# Patient Record
Sex: FEMALE | Race: WHITE | NOT HISPANIC OR LATINO | Employment: FULL TIME | URBAN - METROPOLITAN AREA
[De-identification: names, ages, dates, MRNs, and addresses within clinical notes are randomized per-mention and may not be internally consistent; named-entity substitution may affect disease eponyms.]

---

## 2023-03-12 DIAGNOSIS — Z78.9 ALLERGY HISTORY UNKNOWN: ICD-10-CM

## 2023-03-13 RX ORDER — LANCETS 30 GAUGE
EACH MISCELLANEOUS
COMMUNITY
Start: 2022-09-12

## 2023-03-13 RX ORDER — HYDROXYZINE PAMOATE 50 MG/1
CAPSULE ORAL
COMMUNITY
Start: 2022-09-11 | End: 2023-04-28

## 2023-03-13 RX ORDER — OXYCODONE AND ACETAMINOPHEN 5; 325 MG/1; MG/1
1 TABLET ORAL EVERY 6 HOURS PRN
COMMUNITY
Start: 2022-07-25 | End: 2023-04-28 | Stop reason: ALTCHOICE

## 2023-03-13 RX ORDER — FLUTICASONE PROPIONATE 50 MCG
SPRAY, SUSPENSION (ML) NASAL
Qty: 16 G | Refills: 10 | Status: SHIPPED | OUTPATIENT
Start: 2023-03-13 | End: 2023-04-28 | Stop reason: SDUPTHER

## 2023-03-13 RX ORDER — METFORMIN HYDROCHLORIDE 750 MG/1
1 TABLET, EXTENDED RELEASE ORAL DAILY
COMMUNITY
Start: 2022-02-25 | End: 2023-04-28 | Stop reason: SINTOL

## 2023-03-13 RX ORDER — PANTOPRAZOLE SODIUM 40 MG/1
1 TABLET, DELAYED RELEASE ORAL 2 TIMES DAILY
COMMUNITY
Start: 2022-05-24 | End: 2023-04-05

## 2023-03-13 RX ORDER — TRAZODONE HYDROCHLORIDE 50 MG/1
TABLET ORAL
COMMUNITY
End: 2023-04-28

## 2023-03-13 RX ORDER — ALBUTEROL SULFATE 90 UG/1
AEROSOL, METERED RESPIRATORY (INHALATION)
COMMUNITY
Start: 2022-01-25 | End: 2023-04-28 | Stop reason: SDUPTHER

## 2023-03-13 RX ORDER — NEOMYCIN SULFATE, POLYMYXIN B SULFATE AND DEXAMETHASONE 3.5; 10000; 1 MG/ML; [USP'U]/ML; MG/ML
1 SUSPENSION/ DROPS OPHTHALMIC 2 TIMES DAILY
COMMUNITY
Start: 2022-03-16 | End: 2023-04-28

## 2023-03-13 RX ORDER — HYDROCHLOROTHIAZIDE 12.5 MG/1
1 CAPSULE ORAL DAILY
COMMUNITY
Start: 2022-01-25 | End: 2023-04-28 | Stop reason: SDUPTHER

## 2023-03-13 RX ORDER — PREDNISONE 20 MG/1
2 TABLET ORAL DAILY
COMMUNITY
Start: 2023-01-28 | End: 2023-04-28 | Stop reason: ALTCHOICE

## 2023-03-13 RX ORDER — IPRATROPIUM BROMIDE AND ALBUTEROL 20; 100 UG/1; UG/1
SPRAY, METERED RESPIRATORY (INHALATION)
COMMUNITY
End: 2023-04-28 | Stop reason: SDUPTHER

## 2023-03-13 RX ORDER — EMPAGLIFLOZIN 25 MG/1
1 TABLET, FILM COATED ORAL DAILY
COMMUNITY
Start: 2022-05-24 | End: 2023-04-28 | Stop reason: SDUPTHER

## 2023-03-13 RX ORDER — NAPROXEN 500 MG/1
TABLET ORAL
COMMUNITY
Start: 2023-03-06

## 2023-03-13 RX ORDER — TRAZODONE HYDROCHLORIDE 100 MG/1
1 TABLET ORAL NIGHTLY
COMMUNITY
Start: 2021-11-15 | End: 2023-04-28 | Stop reason: ALTCHOICE

## 2023-03-13 RX ORDER — OMEPRAZOLE 20 MG/1
20 CAPSULE, DELAYED RELEASE ORAL
COMMUNITY
Start: 2022-03-27 | End: 2023-12-15 | Stop reason: WASHOUT

## 2023-03-13 RX ORDER — ALCOHOL ANTISEPTIC PADS
PADS, MEDICATED (EA) TOPICAL
COMMUNITY
Start: 2022-09-12

## 2023-03-13 RX ORDER — AMOXICILLIN AND CLAVULANATE POTASSIUM 875; 125 MG/1; MG/1
1 TABLET, FILM COATED ORAL 2 TIMES DAILY
COMMUNITY
Start: 2023-01-28 | End: 2023-04-28 | Stop reason: ALTCHOICE

## 2023-03-13 RX ORDER — GABAPENTIN 100 MG/1
1 CAPSULE ORAL 2 TIMES DAILY
COMMUNITY
Start: 2022-02-25 | End: 2023-04-28

## 2023-03-13 RX ORDER — LANCETS 30 GAUGE
EACH MISCELLANEOUS
COMMUNITY
Start: 2022-07-12

## 2023-03-13 RX ORDER — DICLOFENAC SODIUM 10 MG/G
GEL TOPICAL DAILY
COMMUNITY
Start: 2022-11-11 | End: 2023-04-28 | Stop reason: SDUPTHER

## 2023-03-13 RX ORDER — EMPAGLIFLOZIN 10 MG/1
10 TABLET, FILM COATED ORAL DAILY
COMMUNITY
End: 2023-04-28

## 2023-03-13 RX ORDER — GLIPIZIDE 5 MG/1
1 TABLET ORAL 3 TIMES DAILY
COMMUNITY
Start: 2022-01-25 | End: 2023-04-28 | Stop reason: SDUPTHER

## 2023-03-13 RX ORDER — ESCITALOPRAM OXALATE 20 MG/1
1 TABLET ORAL NIGHTLY
COMMUNITY
Start: 2022-01-25 | End: 2023-04-28 | Stop reason: ALTCHOICE

## 2023-03-13 RX ORDER — ATORVASTATIN CALCIUM 40 MG/1
1 TABLET, FILM COATED ORAL DAILY
COMMUNITY
Start: 2022-01-25 | End: 2023-04-28 | Stop reason: SDUPTHER

## 2023-03-13 RX ORDER — FLUTICASONE PROPIONATE 50 MCG
SPRAY, SUSPENSION (ML) NASAL
COMMUNITY
Start: 2022-01-25 | End: 2023-04-28

## 2023-03-13 RX ORDER — MONTELUKAST SODIUM 10 MG/1
TABLET ORAL
Qty: 30 TABLET | Refills: 10 | Status: SHIPPED | OUTPATIENT
Start: 2023-03-13 | End: 2023-04-28 | Stop reason: SDUPTHER

## 2023-03-13 RX ORDER — HYDROXYZINE PAMOATE 25 MG/1
CAPSULE ORAL
COMMUNITY
Start: 2023-03-03 | End: 2023-04-28

## 2023-03-13 RX ORDER — BLOOD-GLUCOSE METER
EACH MISCELLANEOUS
COMMUNITY
Start: 2022-05-24

## 2023-03-13 RX ORDER — NICOTINE 7MG/24HR
PATCH, TRANSDERMAL 24 HOURS TRANSDERMAL
COMMUNITY
End: 2023-12-15 | Stop reason: WASHOUT

## 2023-03-13 RX ORDER — MONTELUKAST SODIUM 10 MG/1
1 TABLET ORAL NIGHTLY
COMMUNITY
Start: 2022-01-25 | End: 2023-04-28

## 2023-04-04 DIAGNOSIS — K21.9 GASTROESOPHAGEAL REFLUX DISEASE WITHOUT ESOPHAGITIS: Primary | ICD-10-CM

## 2023-04-05 RX ORDER — PANTOPRAZOLE SODIUM 40 MG/1
TABLET, DELAYED RELEASE ORAL
Qty: 90 TABLET | Refills: 2 | Status: SHIPPED | OUTPATIENT
Start: 2023-04-05

## 2023-04-28 ENCOUNTER — OFFICE VISIT (OUTPATIENT)
Dept: PRIMARY CARE | Facility: CLINIC | Age: 53
End: 2023-04-28
Payer: COMMERCIAL

## 2023-04-28 VITALS
BODY MASS INDEX: 37.17 KG/M2 | WEIGHT: 202 LBS | SYSTOLIC BLOOD PRESSURE: 138 MMHG | HEIGHT: 62 IN | DIASTOLIC BLOOD PRESSURE: 82 MMHG

## 2023-04-28 DIAGNOSIS — E11.9 TYPE 2 DIABETES MELLITUS WITHOUT COMPLICATION, WITHOUT LONG-TERM CURRENT USE OF INSULIN (MULTI): Primary | ICD-10-CM

## 2023-04-28 DIAGNOSIS — Z78.9 ALLERGY HISTORY UNKNOWN: ICD-10-CM

## 2023-04-28 DIAGNOSIS — F41.9 ANXIETY: ICD-10-CM

## 2023-04-28 DIAGNOSIS — M19.90 ARTHRITIS: ICD-10-CM

## 2023-04-28 DIAGNOSIS — J43.9 PULMONARY EMPHYSEMA, UNSPECIFIED EMPHYSEMA TYPE (MULTI): ICD-10-CM

## 2023-04-28 DIAGNOSIS — Z00.00 HEALTHCARE MAINTENANCE: ICD-10-CM

## 2023-04-28 DIAGNOSIS — Z91.199 NONCOMPLIANCE WITH DIABETES TREATMENT: ICD-10-CM

## 2023-04-28 DIAGNOSIS — I10 PRIMARY HYPERTENSION: ICD-10-CM

## 2023-04-28 PROBLEM — J43.8 OTHER EMPHYSEMA (MULTI): Status: ACTIVE | Noted: 2023-04-28

## 2023-04-28 PROBLEM — E13.9 DIABETES 1.5, MANAGED AS TYPE 2 (MULTI): Status: ACTIVE | Noted: 2023-04-28

## 2023-04-28 PROBLEM — E78.41 ELEVATED LIPOPROTEIN(A): Status: ACTIVE | Noted: 2023-04-28

## 2023-04-28 PROBLEM — E66.9 OBESITY: Status: ACTIVE | Noted: 2023-04-28

## 2023-04-28 PROCEDURE — 99214 OFFICE O/P EST MOD 30 MIN: CPT | Performed by: FAMILY MEDICINE

## 2023-04-28 PROCEDURE — 3008F BODY MASS INDEX DOCD: CPT | Performed by: FAMILY MEDICINE

## 2023-04-28 PROCEDURE — 1036F TOBACCO NON-USER: CPT | Performed by: FAMILY MEDICINE

## 2023-04-28 PROCEDURE — 3079F DIAST BP 80-89 MM HG: CPT | Performed by: FAMILY MEDICINE

## 2023-04-28 PROCEDURE — 4010F ACE/ARB THERAPY RXD/TAKEN: CPT | Performed by: FAMILY MEDICINE

## 2023-04-28 PROCEDURE — 3075F SYST BP GE 130 - 139MM HG: CPT | Performed by: FAMILY MEDICINE

## 2023-04-28 RX ORDER — EMPAGLIFLOZIN 25 MG/1
25 TABLET, FILM COATED ORAL DAILY
Qty: 90 TABLET | Refills: 3 | Status: SHIPPED | OUTPATIENT
Start: 2023-04-28

## 2023-04-28 RX ORDER — GLIPIZIDE 5 MG/1
5 TABLET ORAL 3 TIMES DAILY
Qty: 90 TABLET | Refills: 3 | Status: SHIPPED | OUTPATIENT
Start: 2023-04-28

## 2023-04-28 RX ORDER — MONTELUKAST SODIUM 10 MG/1
10 TABLET ORAL NIGHTLY
Qty: 30 TABLET | Refills: 10 | Status: SHIPPED | OUTPATIENT
Start: 2023-04-28

## 2023-04-28 RX ORDER — HYDROCHLOROTHIAZIDE 12.5 MG/1
12.5 CAPSULE ORAL DAILY
Qty: 90 CAPSULE | Refills: 3 | Status: SHIPPED | OUTPATIENT
Start: 2023-04-28

## 2023-04-28 RX ORDER — DICLOFENAC SODIUM 10 MG/G
4 GEL TOPICAL DAILY
Qty: 20 G | Refills: 3 | Status: SHIPPED | OUTPATIENT
Start: 2023-04-28

## 2023-04-28 RX ORDER — LISINOPRIL 5 MG/1
5 TABLET ORAL DAILY
Qty: 30 TABLET | Refills: 5 | Status: SHIPPED | OUTPATIENT
Start: 2023-04-28 | End: 2023-10-25

## 2023-04-28 RX ORDER — HYDROXYZINE PAMOATE 25 MG/1
25 CAPSULE ORAL 3 TIMES DAILY PRN
Qty: 30 CAPSULE | Refills: 3 | Status: SHIPPED | OUTPATIENT
Start: 2023-04-28

## 2023-04-28 RX ORDER — ALBUTEROL SULFATE 90 UG/1
1 AEROSOL, METERED RESPIRATORY (INHALATION)
Qty: 18 G | Refills: 3 | Status: SHIPPED | OUTPATIENT
Start: 2023-04-28

## 2023-04-28 RX ORDER — ATORVASTATIN CALCIUM 40 MG/1
40 TABLET, FILM COATED ORAL DAILY
Qty: 90 TABLET | Refills: 3 | Status: SHIPPED | OUTPATIENT
Start: 2023-04-28

## 2023-04-28 RX ORDER — IPRATROPIUM BROMIDE AND ALBUTEROL 20; 100 UG/1; UG/1
1 SPRAY, METERED RESPIRATORY (INHALATION)
Qty: 4 G | Refills: 3 | Status: SHIPPED | OUTPATIENT
Start: 2023-04-28 | End: 2024-04-27

## 2023-04-28 RX ORDER — FLUTICASONE PROPIONATE 50 MCG
2 SPRAY, SUSPENSION (ML) NASAL DAILY
Qty: 16 G | Refills: 10 | Status: SHIPPED | OUTPATIENT
Start: 2023-04-28

## 2023-04-28 ASSESSMENT — ENCOUNTER SYMPTOMS
LIGHT-HEADEDNESS: 1
CARDIOVASCULAR NEGATIVE: 1
COUGH: 1
FREQUENCY: 1
ARTHRALGIAS: 1
HEMATOLOGIC/LYMPHATIC NEGATIVE: 1
WEAKNESS: 1
CONSTIPATION: 1
NERVOUS/ANXIOUS: 1
UNEXPECTED WEIGHT CHANGE: 1

## 2023-04-28 NOTE — PROGRESS NOTES
"Subjective   Patient ID: Vannessa Soto is a 53 y.o. female who presents for Follow-up (Patient presented to for medication management.) and Cough.    RX, ROV         Review of Systems   Constitutional:  Positive for unexpected weight change.   HENT: Negative.     Eyes:  Positive for visual disturbance.   Respiratory:  Positive for cough.    Cardiovascular: Negative.    Gastrointestinal:  Positive for constipation.   Endocrine: Positive for polyuria.   Genitourinary:  Positive for frequency.   Musculoskeletal:  Positive for arthralgias.   Skin: Negative.    Allergic/Immunologic: Positive for environmental allergies.   Neurological:  Positive for weakness and light-headedness.   Hematological: Negative.    Psychiatric/Behavioral:  The patient is nervous/anxious.        Objective   /82   Ht 1.575 m (5' 2\")   Wt 91.6 kg (202 lb)   BMI 36.95 kg/m²     Physical Exam  Constitutional:       Appearance: She is obese.   HENT:      Head: Normocephalic and atraumatic.      Nose: Nose normal.      Mouth/Throat:      Mouth: Mucous membranes are moist.   Eyes:      Pupils: Pupils are equal, round, and reactive to light.   Cardiovascular:      Rate and Rhythm: Normal rate and regular rhythm.   Pulmonary:      Effort: Pulmonary effort is normal.   Musculoskeletal:         General: Normal range of motion.      Cervical back: Normal range of motion and neck supple.   Skin:     General: Skin is warm and dry.   Neurological:      General: No focal deficit present.      Mental Status: She is alert and oriented to person, place, and time.   Psychiatric:         Mood and Affect: Mood normal.         Behavior: Behavior normal.         Assessment/Plan          "

## 2023-04-28 NOTE — PATIENT INSTRUCTIONS
Stop smoking , refuses Tx , Rx ., labs cxr mamogram, monitor BP/BS daily , follow up oph / pod , gyn , cologuard , labs tcbx 1wk

## 2023-05-01 ENCOUNTER — LAB (OUTPATIENT)
Dept: LAB | Facility: LAB | Age: 53
End: 2023-05-01
Payer: COMMERCIAL

## 2023-05-01 DIAGNOSIS — E11.9 TYPE 2 DIABETES MELLITUS WITHOUT COMPLICATION, WITHOUT LONG-TERM CURRENT USE OF INSULIN (MULTI): ICD-10-CM

## 2023-05-01 DIAGNOSIS — I10 PRIMARY HYPERTENSION: ICD-10-CM

## 2023-05-01 DIAGNOSIS — F41.9 ANXIETY: ICD-10-CM

## 2023-05-01 LAB
ALANINE AMINOTRANSFERASE (SGPT) (U/L) IN SER/PLAS: 14 U/L (ref 7–45)
ALBUMIN (G/DL) IN SER/PLAS: 4.5 G/DL (ref 3.4–5)
ALBUMIN (MG/L) IN URINE: 21.7 MG/L
ALBUMIN/CREATININE (UG/MG) IN URINE: 16.6 UG/MG CRT (ref 0–30)
ALKALINE PHOSPHATASE (U/L) IN SER/PLAS: 115 U/L (ref 33–110)
ANION GAP IN SER/PLAS: 11 MMOL/L (ref 10–20)
ASPARTATE AMINOTRANSFERASE (SGOT) (U/L) IN SER/PLAS: 10 U/L (ref 9–39)
BILIRUBIN TOTAL (MG/DL) IN SER/PLAS: 0.5 MG/DL (ref 0–1.2)
CALCIUM (MG/DL) IN SER/PLAS: 10.7 MG/DL (ref 8.6–10.3)
CARBON DIOXIDE, TOTAL (MMOL/L) IN SER/PLAS: 31 MMOL/L (ref 21–32)
CHLORIDE (MMOL/L) IN SER/PLAS: 101 MMOL/L (ref 98–107)
CHOLESTEROL (MG/DL) IN SER/PLAS: 169 MG/DL (ref 0–199)
CHOLESTEROL IN HDL (MG/DL) IN SER/PLAS: 40.3 MG/DL
CHOLESTEROL/HDL RATIO: 4.2
CREATININE (MG/DL) IN SER/PLAS: 0.88 MG/DL (ref 0.5–1.05)
CREATININE (MG/DL) IN URINE: 131 MG/DL (ref 20–320)
ERYTHROCYTE DISTRIBUTION WIDTH (RATIO) BY AUTOMATED COUNT: 12.5 % (ref 11.5–14.5)
ERYTHROCYTE MEAN CORPUSCULAR HEMOGLOBIN CONCENTRATION (G/DL) BY AUTOMATED: 33.8 G/DL (ref 32–36)
ERYTHROCYTE MEAN CORPUSCULAR VOLUME (FL) BY AUTOMATED COUNT: 90 FL (ref 80–100)
ERYTHROCYTES (10*6/UL) IN BLOOD BY AUTOMATED COUNT: 5.43 X10E12/L (ref 4–5.2)
ESTIMATED AVERAGE GLUCOSE FOR HBA1C: 197 MG/DL
GFR FEMALE: 77 ML/MIN/1.73M2
GLUCOSE (MG/DL) IN SER/PLAS: 284 MG/DL (ref 74–99)
HEMATOCRIT (%) IN BLOOD BY AUTOMATED COUNT: 48.8 % (ref 36–46)
HEMOGLOBIN (G/DL) IN BLOOD: 16.5 G/DL (ref 12–16)
HEMOGLOBIN A1C/HEMOGLOBIN TOTAL IN BLOOD: 8.5 %
LDL: 103 MG/DL (ref 0–99)
LEUKOCYTES (10*3/UL) IN BLOOD BY AUTOMATED COUNT: 12.1 X10E9/L (ref 4.4–11.3)
PLATELETS (10*3/UL) IN BLOOD AUTOMATED COUNT: 249 X10E9/L (ref 150–450)
POTASSIUM (MMOL/L) IN SER/PLAS: 4.7 MMOL/L (ref 3.5–5.3)
PROTEIN TOTAL: 6.8 G/DL (ref 6.4–8.2)
SODIUM (MMOL/L) IN SER/PLAS: 138 MMOL/L (ref 136–145)
THYROTROPIN (MIU/L) IN SER/PLAS BY DETECTION LIMIT <= 0.05 MIU/L: 1.38 MIU/L (ref 0.44–3.98)
TRIGLYCERIDE (MG/DL) IN SER/PLAS: 128 MG/DL (ref 0–149)
UREA NITROGEN (MG/DL) IN SER/PLAS: 21 MG/DL (ref 6–23)
VLDL: 26 MG/DL (ref 0–40)

## 2023-05-01 PROCEDURE — 85027 COMPLETE CBC AUTOMATED: CPT

## 2023-05-01 PROCEDURE — 80053 COMPREHEN METABOLIC PANEL: CPT

## 2023-05-01 PROCEDURE — 84443 ASSAY THYROID STIM HORMONE: CPT

## 2023-05-01 PROCEDURE — 80061 LIPID PANEL: CPT

## 2023-05-01 PROCEDURE — 82570 ASSAY OF URINE CREATININE: CPT

## 2023-05-01 PROCEDURE — 82043 UR ALBUMIN QUANTITATIVE: CPT

## 2023-05-01 PROCEDURE — 83036 HEMOGLOBIN GLYCOSYLATED A1C: CPT

## 2023-05-01 PROCEDURE — 36415 COLL VENOUS BLD VENIPUNCTURE: CPT

## 2023-05-16 ENCOUNTER — TELEPHONE (OUTPATIENT)
Dept: PRIMARY CARE | Facility: CLINIC | Age: 53
End: 2023-05-16
Payer: COMMERCIAL

## 2023-06-14 RX ORDER — NAPROXEN 500 MG/1
TABLET ORAL
Qty: 30 TABLET | Refills: 10 | OUTPATIENT
Start: 2023-06-14

## 2023-06-20 RX ORDER — NAPROXEN 500 MG/1
TABLET ORAL
Qty: 30 TABLET | Refills: 10 | OUTPATIENT
Start: 2023-06-20

## 2023-08-09 ENCOUNTER — TRANSCRIBE ORDERS (OUTPATIENT)
Dept: ADMINISTRATIVE | Age: 53
End: 2023-08-09

## 2023-08-09 DIAGNOSIS — Z72.0 TOBACCO ABUSE: Primary | ICD-10-CM

## 2023-08-09 DIAGNOSIS — G56.03 CARPAL TUNNEL SYNDROME, BILATERAL: ICD-10-CM

## 2023-08-09 DIAGNOSIS — Z12.31 ENCOUNTER FOR SCREENING MAMMOGRAM FOR MALIGNANT NEOPLASM OF BREAST: ICD-10-CM

## 2023-08-24 ENCOUNTER — HOSPITAL ENCOUNTER (OUTPATIENT)
Dept: WOMENS IMAGING | Age: 53
Discharge: HOME | End: 2023-08-26
Payer: MEDICAID

## 2023-08-24 ENCOUNTER — HOSPITAL ENCOUNTER (OUTPATIENT)
Dept: CT IMAGING | Age: 53
Discharge: HOME | End: 2023-08-26
Payer: MEDICAID

## 2023-08-24 VITALS — HEIGHT: 62 IN

## 2023-08-24 DIAGNOSIS — Z72.0 TOBACCO ABUSE: ICD-10-CM

## 2023-08-24 DIAGNOSIS — Z12.31 ENCOUNTER FOR SCREENING MAMMOGRAM FOR MALIGNANT NEOPLASM OF BREAST: ICD-10-CM

## 2023-08-24 PROCEDURE — 71271 CT THORAX LUNG CANCER SCR C-: CPT

## 2023-08-24 PROCEDURE — 77063 BREAST TOMOSYNTHESIS BI: CPT

## 2023-08-25 DIAGNOSIS — K21.9 GASTROESOPHAGEAL REFLUX DISEASE WITHOUT ESOPHAGITIS: ICD-10-CM

## 2023-08-25 RX ORDER — PANTOPRAZOLE SODIUM 40 MG/1
40 TABLET, DELAYED RELEASE ORAL 2 TIMES DAILY
Qty: 90 TABLET | Refills: 2 | Status: CANCELLED | OUTPATIENT
Start: 2023-08-25

## 2023-08-26 NOTE — TELEPHONE ENCOUNTER
Pt is not scheduled with anyone. She needs to schedule f/up to establish with someone, then see if they'll refill her meds, as it looks like she's not following up anywhere, thanks. (It does look like she saw Dr. ALEGRE 6 times last year so she's definitley overdue for f/up. Thanks.

## 2023-09-25 RX ORDER — POLYETHYLENE GLYCOL 3350, SODIUM CHLORIDE, SODIUM BICARBONATE, POTASSIUM CHLORIDE 420; 11.2; 5.72; 1.48 G/4L; G/4L; G/4L; G/4L
4000 POWDER, FOR SOLUTION ORAL ONCE
Qty: 4000 ML | Refills: 0 | Status: SHIPPED | OUTPATIENT
Start: 2023-09-25 | End: 2023-09-25

## 2023-11-01 ENCOUNTER — OFFICE VISIT (OUTPATIENT)
Dept: SURGERY | Facility: CLINIC | Age: 53
End: 2023-11-01
Payer: COMMERCIAL

## 2023-11-01 VITALS — BODY MASS INDEX: 38.23 KG/M2 | WEIGHT: 209 LBS

## 2023-11-01 DIAGNOSIS — L72.9 SCALP CYST: Primary | ICD-10-CM

## 2023-11-01 PROBLEM — E78.00 HYPERCHOLESTEROLEMIA: Status: ACTIVE | Noted: 2021-11-19

## 2023-11-01 PROBLEM — J45.909 ASTHMA (HHS-HCC): Status: ACTIVE | Noted: 2023-11-01

## 2023-11-01 PROBLEM — M19.019 OSTEOARTHRITIS OF SHOULDER: Status: ACTIVE | Noted: 2023-11-01

## 2023-11-01 PROBLEM — G62.9 NEUROPATHY: Status: ACTIVE | Noted: 2023-11-01

## 2023-11-01 PROBLEM — R07.89 ATYPICAL CHEST PAIN: Status: ACTIVE | Noted: 2023-11-01

## 2023-11-01 PROBLEM — J41.0 SIMPLE CHRONIC BRONCHITIS (MULTI): Chronic | Status: ACTIVE | Noted: 2021-11-19

## 2023-11-01 PROBLEM — M19.90 OSTEOARTHRITIS: Status: ACTIVE | Noted: 2023-11-01

## 2023-11-01 PROBLEM — M19.019 SHOULDER ARTHRITIS: Status: ACTIVE | Noted: 2023-11-01

## 2023-11-01 PROBLEM — G56.03 BILATERAL CARPAL TUNNEL SYNDROME: Status: ACTIVE | Noted: 2023-11-01

## 2023-11-01 PROBLEM — F41.9 ANXIETY: Status: ACTIVE | Noted: 2021-11-19

## 2023-11-01 PROBLEM — E66.9 CLASS 2 OBESITY WITH BODY MASS INDEX (BMI) OF 37.0 TO 37.9 IN ADULT: Status: ACTIVE | Noted: 2023-11-01

## 2023-11-01 PROBLEM — E66.812 CLASS 2 OBESITY WITH BODY MASS INDEX (BMI) OF 37.0 TO 37.9 IN ADULT: Status: ACTIVE | Noted: 2023-11-01

## 2023-11-01 PROBLEM — E66.01 OBESITY, CLASS III, BMI 40-49.9 (MORBID OBESITY) (MULTI): Status: ACTIVE | Noted: 2023-11-01

## 2023-11-01 PROBLEM — F17.201 NICOTINE DEPENDENCE IN REMISSION: Status: ACTIVE | Noted: 2023-11-01

## 2023-11-01 PROBLEM — K80.20 CHOLELITHIASES: Status: ACTIVE | Noted: 2023-11-01

## 2023-11-01 PROBLEM — E11.00 TYPE 2 DIABETES MELLITUS WITH HYPEROSMOLARITY WITHOUT COMA, WITHOUT LONG-TERM CURRENT USE OF INSULIN (MULTI): Chronic | Status: ACTIVE | Noted: 2021-11-19

## 2023-11-01 PROBLEM — E78.5 HYPERLIPIDEMIA: Status: ACTIVE | Noted: 2023-11-01

## 2023-11-01 PROBLEM — I10 PRIMARY HYPERTENSION: Status: ACTIVE | Noted: 2021-11-19

## 2023-11-01 PROBLEM — J45.20 MILD INTERMITTENT ASTHMA WITHOUT COMPLICATION (HHS-HCC): Status: ACTIVE | Noted: 2021-11-19

## 2023-11-01 PROBLEM — R10.13 EPIGASTRIC PAIN: Status: ACTIVE | Noted: 2023-11-01

## 2023-11-01 PROBLEM — E11.69 DIABETES MELLITUS TYPE 2 IN OBESE: Status: ACTIVE | Noted: 2023-11-01

## 2023-11-01 PROBLEM — E66.813 OBESITY, CLASS III, BMI 40-49.9 (MORBID OBESITY): Status: ACTIVE | Noted: 2023-11-01

## 2023-11-01 PROBLEM — E66.9 DIABETES MELLITUS TYPE 2 IN OBESE: Status: ACTIVE | Noted: 2023-11-01

## 2023-11-01 PROBLEM — R16.0 HEPATOMEGALY: Status: ACTIVE | Noted: 2023-11-01

## 2023-11-01 PROBLEM — M15.9 DEGENERATIVE JOINT DISEASE INVOLVING MULTIPLE JOINTS: Status: ACTIVE | Noted: 2023-11-01

## 2023-11-01 PROBLEM — K21.9 GASTROESOPHAGEAL REFLUX DISEASE WITHOUT ESOPHAGITIS: Status: ACTIVE | Noted: 2021-11-19

## 2023-11-01 PROBLEM — F32.0 CURRENT MILD EPISODE OF MAJOR DEPRESSIVE DISORDER WITHOUT PRIOR EPISODE (CMS-HCC): Chronic | Status: ACTIVE | Noted: 2021-11-19

## 2023-11-01 PROBLEM — M25.512 LEFT SHOULDER PAIN: Status: ACTIVE | Noted: 2023-11-01

## 2023-11-01 PROCEDURE — 3079F DIAST BP 80-89 MM HG: CPT | Performed by: SURGERY

## 2023-11-01 PROCEDURE — 1036F TOBACCO NON-USER: CPT | Performed by: SURGERY

## 2023-11-01 PROCEDURE — 3052F HG A1C>EQUAL 8.0%<EQUAL 9.0%: CPT | Performed by: SURGERY

## 2023-11-01 PROCEDURE — 99212 OFFICE O/P EST SF 10 MIN: CPT | Performed by: SURGERY

## 2023-11-01 PROCEDURE — 3075F SYST BP GE 130 - 139MM HG: CPT | Performed by: SURGERY

## 2023-11-01 RX ORDER — FLUTICASONE PROPIONATE 110 UG/1
1 AEROSOL, METERED RESPIRATORY (INHALATION) 2 TIMES DAILY
COMMUNITY
Start: 2021-11-19

## 2023-11-01 RX ORDER — ALBUTEROL SULFATE 90 UG/1
2 AEROSOL, METERED RESPIRATORY (INHALATION) EVERY 4 HOURS PRN
COMMUNITY
Start: 2021-11-19

## 2023-11-01 RX ORDER — METFORMIN HYDROCHLORIDE 1000 MG/1
1000 TABLET ORAL
COMMUNITY
Start: 2021-11-19 | End: 2023-12-15 | Stop reason: WASHOUT

## 2023-11-01 RX ORDER — TRAZODONE HYDROCHLORIDE 100 MG/1
100 TABLET ORAL NIGHTLY PRN
COMMUNITY

## 2023-11-01 RX ORDER — METOCLOPRAMIDE 10 MG/1
10 TABLET ORAL DAILY PRN
COMMUNITY
Start: 2023-10-01

## 2023-11-01 RX ORDER — FAMOTIDINE 20 MG/1
TABLET, FILM COATED ORAL
COMMUNITY
Start: 2023-10-16 | End: 2023-12-15 | Stop reason: WASHOUT

## 2023-11-01 RX ORDER — DULAGLUTIDE 1.5 MG/.5ML
INJECTION, SOLUTION SUBCUTANEOUS
COMMUNITY
Start: 2023-10-17

## 2023-11-01 RX ORDER — GABAPENTIN 100 MG/1
100 CAPSULE ORAL 2 TIMES DAILY
COMMUNITY
End: 2023-12-15 | Stop reason: WASHOUT

## 2023-11-01 RX ORDER — FLUCONAZOLE 150 MG/1
150 TABLET ORAL DAILY
COMMUNITY
Start: 2023-07-26 | End: 2023-12-15 | Stop reason: WASHOUT

## 2023-11-01 RX ORDER — CARBOXYMETHYLCELLULOSE SODIUM 0.5 G/100ML
SOLUTION/ DROPS OPHTHALMIC
COMMUNITY
Start: 2023-06-22

## 2023-11-01 RX ORDER — POLYETHYLENE GLYCOL 3350, SODIUM CHLORIDE, SODIUM BICARBONATE, POTASSIUM CHLORIDE 420; 11.2; 5.72; 1.48 G/4L; G/4L; G/4L; G/4L
POWDER, FOR SOLUTION ORAL
COMMUNITY
Start: 2023-09-25 | End: 2023-12-15 | Stop reason: WASHOUT

## 2023-11-01 RX ORDER — SEMAGLUTIDE 0.68 MG/ML
INJECTION, SOLUTION SUBCUTANEOUS
COMMUNITY
Start: 2023-08-10 | End: 2023-12-15 | Stop reason: WASHOUT

## 2023-11-01 RX ORDER — GLIPIZIDE 10 MG/1
10 TABLET ORAL
COMMUNITY
Start: 2021-11-19 | End: 2023-12-15 | Stop reason: WASHOUT

## 2023-11-01 NOTE — PROGRESS NOTES
Subjective   Patient ID: Vannessa Soto is a 53 y.o. female who presents for Cyst (On top of head).  HPI  Patient is seen as in the past for a symptomatic scalp cyst concerned about a second cyst her insurance has been approved  Review of Systems   Skin:         Positive scalp cyst   All other systems reviewed and are negative.      Objective   Physical Exam  HENT:      Head:      Comments: 2 x 2 centimeter anterior scalp cyst no secondary lesion    Physical Exam  Constitutional:       Appearance: Normal appearance.   HENT:      Head: Normocephalic and atraumatic.      Mouth/Throat:      Pharynx: Oropharynx is clear.   Eyes:      Pupils: Pupils are equal, round, and reactive to light.   Cardiovascular:      Rate and Rhythm: Normal rate and regular rhythm.   Pulmonary:      Effort: Pulmonary effort is normal.      Breath sounds: Normal breath sounds.   Abdominal:      General: Abdomen is flat. Bowel sounds are normal.      Palpations: Abdomen is soft.   Musculoskeletal:         General: Normal range of motion.      Cervical back: Normal range of motion.   Skin:     General: Skin is warm.   Neurological:      General: No focal deficit present.      Mental Status: She is alert. Mental status is at baseline.   Psychiatric:         Mood and Affect: Mood normal.      Assessment/Plan   Symptomatic scalp cyst plan excision under local anesthetic need 30 minutes

## 2023-11-11 ENCOUNTER — LAB (OUTPATIENT)
Dept: LAB | Facility: LAB | Age: 53
End: 2023-11-11
Payer: COMMERCIAL

## 2023-11-11 DIAGNOSIS — E11.9 TYPE 2 DIABETES MELLITUS WITHOUT COMPLICATIONS (MULTI): Primary | ICD-10-CM

## 2023-11-11 DIAGNOSIS — E11.9 TYPE 2 DIABETES MELLITUS WITHOUT COMPLICATIONS (MULTI): ICD-10-CM

## 2023-11-11 LAB
ALBUMIN SERPL BCP-MCNC: 4 G/DL (ref 3.4–5)
ALP SERPL-CCNC: 82 U/L (ref 33–110)
ALT SERPL W P-5'-P-CCNC: 13 U/L (ref 7–45)
ANION GAP SERPL CALC-SCNC: 9 MMOL/L (ref 10–20)
AST SERPL W P-5'-P-CCNC: 12 U/L (ref 9–39)
BILIRUB SERPL-MCNC: 0.5 MG/DL (ref 0–1.2)
BUN SERPL-MCNC: 15 MG/DL (ref 6–23)
CALCIUM SERPL-MCNC: 9.8 MG/DL (ref 8.6–10.3)
CHLORIDE SERPL-SCNC: 107 MMOL/L (ref 98–107)
CHOLEST SERPL-MCNC: 199 MG/DL (ref 0–199)
CHOLESTEROL/HDL RATIO: 5.9
CO2 SERPL-SCNC: 29 MMOL/L (ref 21–32)
CREAT SERPL-MCNC: 0.86 MG/DL (ref 0.5–1.05)
CREAT UR-MCNC: 129.4 MG/DL (ref 20–320)
GFR SERPL CREATININE-BSD FRML MDRD: 80 ML/MIN/1.73M*2
GLUCOSE SERPL-MCNC: 112 MG/DL (ref 74–99)
HDLC SERPL-MCNC: 33.9 MG/DL
LDLC SERPL CALC-MCNC: 118 MG/DL
MICROALBUMIN UR-MCNC: 11.3 MG/L
MICROALBUMIN/CREAT UR: 8.7 UG/MG CREAT
NON HDL CHOLESTEROL: 165 MG/DL (ref 0–149)
POTASSIUM SERPL-SCNC: 4.6 MMOL/L (ref 3.5–5.3)
PROT SERPL-MCNC: 6.1 G/DL (ref 6.4–8.2)
SODIUM SERPL-SCNC: 140 MMOL/L (ref 136–145)
TRIGL SERPL-MCNC: 235 MG/DL (ref 0–149)
VLDL: 47 MG/DL (ref 0–40)

## 2023-11-11 PROCEDURE — 86803 HEPATITIS C AB TEST: CPT

## 2023-11-11 PROCEDURE — 80053 COMPREHEN METABOLIC PANEL: CPT

## 2023-11-11 PROCEDURE — 82570 ASSAY OF URINE CREATININE: CPT

## 2023-11-11 PROCEDURE — 82043 UR ALBUMIN QUANTITATIVE: CPT

## 2023-11-11 PROCEDURE — 87389 HIV-1 AG W/HIV-1&-2 AB AG IA: CPT

## 2023-11-11 PROCEDURE — 36415 COLL VENOUS BLD VENIPUNCTURE: CPT

## 2023-11-11 PROCEDURE — 80061 LIPID PANEL: CPT

## 2023-11-12 LAB
HCV AB SER QL: NONREACTIVE
HIV 1+2 AB+HIV1 P24 AG SERPL QL IA: NONREACTIVE

## 2023-11-16 ENCOUNTER — OFFICE VISIT (OUTPATIENT)
Dept: SURGERY | Facility: CLINIC | Age: 53
End: 2023-11-16
Payer: COMMERCIAL

## 2023-11-16 DIAGNOSIS — L72.9 SCALP CYST: Primary | ICD-10-CM

## 2023-11-16 PROCEDURE — 99213 OFFICE O/P EST LOW 20 MIN: CPT | Performed by: SURGERY

## 2023-11-16 PROCEDURE — 1036F TOBACCO NON-USER: CPT | Performed by: SURGERY

## 2023-11-16 PROCEDURE — 3061F NEG MICROALBUMINURIA REV: CPT | Performed by: SURGERY

## 2023-11-16 PROCEDURE — 3049F LDL-C 100-129 MG/DL: CPT | Performed by: SURGERY

## 2023-11-16 PROCEDURE — 3052F HG A1C>EQUAL 8.0%<EQUAL 9.0%: CPT | Performed by: SURGERY

## 2023-11-16 NOTE — PROGRESS NOTES
Subjective   Patient ID: Vannessa Soto is a 53 y.o. female who presents for Follow-up.  HPI  Patient here for excision of scalp cyst reports severe anxiety today  Review of Systems   Skin:  Positive for pallor.   All other systems reviewed and are negative.      Objective   Physical Exam  HENT:      Head:      Comments: 2 x 2 centimeter anterior scalp cyst no secondary lesion    Physical Exam  Constitutional:       Appearance: Normal appearance.   HENT:      Head: Normocephalic and atraumatic.      Mouth/Throat:      Pharynx: Oropharynx is clear.   Eyes:      Pupils: Pupils are equal, round, and reactive to light.   Cardiovascular:      Rate and Rhythm: Normal rate and regular rhythm.   Pulmonary:      Effort: Pulmonary effort is normal.      Breath sounds: Normal breath sounds.   Abdominal:      General: Abdomen is flat. Bowel sounds are normal.      Palpations: Abdomen is soft.   Musculoskeletal:         General: Normal range of motion.      Cervical back: Normal range of motion.   Skin:     General: Skin is warm.   Neurological:      General: No focal deficit present.      Mental Status: She is alert. Mental status is at baseline.   Psychiatric:         Mood and Affect: Mood normal.       Assessment/Plan   Symptomatic scalp cyst discussed in detail with the patient given her severe anxiety recommend we proceed in the OR under MAC and local anesthetic patient agreeable

## 2023-12-14 ENCOUNTER — HOSPITAL ENCOUNTER (OUTPATIENT)
Dept: NEUROLOGY | Facility: HOSPITAL | Age: 53
End: 2023-12-14
Payer: COMMERCIAL

## 2023-12-15 NOTE — PREPROCEDURE INSTRUCTIONS
o   Reviewed pre-op instructions with patient including NPO after midnight, must have , hospital and check in location, and day of surgery routine. Patient aware to use Inhaler the morning of procedure as diercted.                        A

## 2023-12-18 ENCOUNTER — ANESTHESIA EVENT (OUTPATIENT)
Dept: OPERATING ROOM | Facility: HOSPITAL | Age: 53
End: 2023-12-18
Payer: COMMERCIAL

## 2023-12-18 ENCOUNTER — HOSPITAL ENCOUNTER (OUTPATIENT)
Facility: HOSPITAL | Age: 53
Setting detail: OUTPATIENT SURGERY
Discharge: HOME | End: 2023-12-18
Attending: SURGERY | Admitting: SURGERY
Payer: COMMERCIAL

## 2023-12-18 ENCOUNTER — ANESTHESIA (OUTPATIENT)
Dept: OPERATING ROOM | Facility: HOSPITAL | Age: 53
End: 2023-12-18
Payer: COMMERCIAL

## 2023-12-18 VITALS
SYSTOLIC BLOOD PRESSURE: 128 MMHG | OXYGEN SATURATION: 96 % | HEIGHT: 62 IN | WEIGHT: 202.38 LBS | RESPIRATION RATE: 18 BRPM | DIASTOLIC BLOOD PRESSURE: 75 MMHG | TEMPERATURE: 97.2 F | HEART RATE: 76 BPM | BODY MASS INDEX: 37.24 KG/M2

## 2023-12-18 DIAGNOSIS — L72.9 SCALP CYST: Primary | ICD-10-CM

## 2023-12-18 LAB — GLUCOSE BLD MANUAL STRIP-MCNC: 109 MG/DL (ref 74–99)

## 2023-12-18 PROCEDURE — 2500000004 HC RX 250 GENERAL PHARMACY W/ HCPCS (ALT 636 FOR OP/ED): Performed by: ANESTHESIOLOGY

## 2023-12-18 PROCEDURE — 3600000008 HC OR TIME - EACH INCREMENTAL 1 MINUTE - PROCEDURE LEVEL THREE: Performed by: SURGERY

## 2023-12-18 PROCEDURE — 7100000010 HC PHASE TWO TIME - EACH INCREMENTAL 1 MINUTE: Performed by: SURGERY

## 2023-12-18 PROCEDURE — 3700000001 HC GENERAL ANESTHESIA TIME - INITIAL BASE CHARGE: Performed by: SURGERY

## 2023-12-18 PROCEDURE — 2500000005 HC RX 250 GENERAL PHARMACY W/O HCPCS: Performed by: SURGERY

## 2023-12-18 PROCEDURE — 88304 TISSUE EXAM BY PATHOLOGIST: CPT | Mod: TC,SUR,ELYLAB | Performed by: SURGERY

## 2023-12-18 PROCEDURE — 7100000009 HC PHASE TWO TIME - INITIAL BASE CHARGE: Performed by: SURGERY

## 2023-12-18 PROCEDURE — 2500000005 HC RX 250 GENERAL PHARMACY W/O HCPCS: Performed by: ANESTHESIOLOGY

## 2023-12-18 PROCEDURE — 2500000004 HC RX 250 GENERAL PHARMACY W/ HCPCS (ALT 636 FOR OP/ED): Performed by: SURGERY

## 2023-12-18 PROCEDURE — 21011 EXC FACE LES SC <2 CM: CPT | Performed by: SURGERY

## 2023-12-18 PROCEDURE — 2720000007 HC OR 272 NO HCPCS: Performed by: SURGERY

## 2023-12-18 PROCEDURE — 2500000001 HC RX 250 WO HCPCS SELF ADMINISTERED DRUGS (ALT 637 FOR MEDICARE OP): Performed by: SURGERY

## 2023-12-18 PROCEDURE — 3600000003 HC OR TIME - INITIAL BASE CHARGE - PROCEDURE LEVEL THREE: Performed by: SURGERY

## 2023-12-18 PROCEDURE — 88304 TISSUE EXAM BY PATHOLOGIST: CPT | Performed by: PATHOLOGY

## 2023-12-18 PROCEDURE — 3700000002 HC GENERAL ANESTHESIA TIME - EACH INCREMENTAL 1 MINUTE: Performed by: SURGERY

## 2023-12-18 PROCEDURE — 82947 ASSAY GLUCOSE BLOOD QUANT: CPT

## 2023-12-18 PROCEDURE — A4217 STERILE WATER/SALINE, 500 ML: HCPCS | Performed by: SURGERY

## 2023-12-18 RX ORDER — SODIUM CHLORIDE, SODIUM LACTATE, POTASSIUM CHLORIDE, CALCIUM CHLORIDE 600; 310; 30; 20 MG/100ML; MG/100ML; MG/100ML; MG/100ML
100 INJECTION, SOLUTION INTRAVENOUS CONTINUOUS
Status: DISCONTINUED | OUTPATIENT
Start: 2023-12-18 | End: 2023-12-18 | Stop reason: HOSPADM

## 2023-12-18 RX ORDER — ONDANSETRON HYDROCHLORIDE 2 MG/ML
INJECTION, SOLUTION INTRAVENOUS AS NEEDED
Status: DISCONTINUED | OUTPATIENT
Start: 2023-12-18 | End: 2023-12-18

## 2023-12-18 RX ORDER — SODIUM CHLORIDE 0.9 G/100ML
IRRIGANT IRRIGATION AS NEEDED
Status: DISCONTINUED | OUTPATIENT
Start: 2023-12-18 | End: 2023-12-18 | Stop reason: HOSPADM

## 2023-12-18 RX ORDER — MEPERIDINE HYDROCHLORIDE 25 MG/ML
12.5 INJECTION INTRAMUSCULAR; INTRAVENOUS; SUBCUTANEOUS EVERY 10 MIN PRN
Status: CANCELLED | OUTPATIENT
Start: 2023-12-18

## 2023-12-18 RX ORDER — GLYCOPYRROLATE 0.2 MG/ML
INJECTION INTRAMUSCULAR; INTRAVENOUS AS NEEDED
Status: DISCONTINUED | OUTPATIENT
Start: 2023-12-18 | End: 2023-12-18

## 2023-12-18 RX ORDER — PROPOFOL 10 MG/ML
INJECTION, EMULSION INTRAVENOUS AS NEEDED
Status: DISCONTINUED | OUTPATIENT
Start: 2023-12-18 | End: 2023-12-18

## 2023-12-18 RX ORDER — LIDOCAINE HYDROCHLORIDE 20 MG/ML
INJECTION, SOLUTION EPIDURAL; INFILTRATION; INTRACAUDAL; PERINEURAL AS NEEDED
Status: DISCONTINUED | OUTPATIENT
Start: 2023-12-18 | End: 2023-12-18

## 2023-12-18 RX ORDER — OXYCODONE HYDROCHLORIDE 5 MG/1
5 TABLET ORAL EVERY 4 HOURS PRN
Status: DISCONTINUED | OUTPATIENT
Start: 2023-12-18 | End: 2023-12-18 | Stop reason: HOSPADM

## 2023-12-18 RX ORDER — PROPOFOL 10 MG/ML
INJECTION, EMULSION INTRAVENOUS CONTINUOUS PRN
Status: DISCONTINUED | OUTPATIENT
Start: 2023-12-18 | End: 2023-12-18

## 2023-12-18 RX ORDER — SODIUM CHLORIDE, SODIUM LACTATE, POTASSIUM CHLORIDE, CALCIUM CHLORIDE 600; 310; 30; 20 MG/100ML; MG/100ML; MG/100ML; MG/100ML
100 INJECTION, SOLUTION INTRAVENOUS CONTINUOUS
Status: CANCELLED | OUTPATIENT
Start: 2023-12-18

## 2023-12-18 RX ORDER — FENTANYL CITRATE 50 UG/ML
50 INJECTION, SOLUTION INTRAMUSCULAR; INTRAVENOUS EVERY 5 MIN PRN
Status: CANCELLED | OUTPATIENT
Start: 2023-12-18

## 2023-12-18 RX ORDER — FENTANYL CITRATE 50 UG/ML
INJECTION, SOLUTION INTRAMUSCULAR; INTRAVENOUS AS NEEDED
Status: DISCONTINUED | OUTPATIENT
Start: 2023-12-18 | End: 2023-12-18

## 2023-12-18 RX ORDER — MIDAZOLAM HYDROCHLORIDE 1 MG/ML
INJECTION, SOLUTION INTRAMUSCULAR; INTRAVENOUS AS NEEDED
Status: DISCONTINUED | OUTPATIENT
Start: 2023-12-18 | End: 2023-12-18

## 2023-12-18 RX ORDER — ONDANSETRON HYDROCHLORIDE 2 MG/ML
4 INJECTION, SOLUTION INTRAVENOUS ONCE AS NEEDED
Status: CANCELLED | OUTPATIENT
Start: 2023-12-18

## 2023-12-18 RX ORDER — BACITRACIN 500 [USP'U]/G
OINTMENT TOPICAL AS NEEDED
Status: DISCONTINUED | OUTPATIENT
Start: 2023-12-18 | End: 2023-12-18 | Stop reason: HOSPADM

## 2023-12-18 RX ORDER — BUPIVACAINE HCL/EPINEPHRINE 0.5-1:200K
VIAL (ML) INJECTION AS NEEDED
Status: DISCONTINUED | OUTPATIENT
Start: 2023-12-18 | End: 2023-12-18 | Stop reason: HOSPADM

## 2023-12-18 RX ADMIN — PROPOFOL 30 MG: 10 INJECTION, EMULSION INTRAVENOUS at 07:55

## 2023-12-18 RX ADMIN — GLYCOPYRROLATE 0.2 MG: 0.2 INJECTION, SOLUTION INTRAMUSCULAR; INTRAVENOUS at 07:40

## 2023-12-18 RX ADMIN — SODIUM CHLORIDE, POTASSIUM CHLORIDE, SODIUM LACTATE AND CALCIUM CHLORIDE: 600; 310; 30; 20 INJECTION, SOLUTION INTRAVENOUS at 07:28

## 2023-12-18 RX ADMIN — SODIUM CHLORIDE, POTASSIUM CHLORIDE, SODIUM LACTATE AND CALCIUM CHLORIDE 100 ML/HR: 600; 310; 30; 20 INJECTION, SOLUTION INTRAVENOUS at 07:22

## 2023-12-18 RX ADMIN — MIDAZOLAM HYDROCHLORIDE 3 MG: 1 INJECTION, SOLUTION INTRAMUSCULAR; INTRAVENOUS at 07:55

## 2023-12-18 RX ADMIN — PROPOFOL 50 MCG/KG/MIN: 10 INJECTION, EMULSION INTRAVENOUS at 07:55

## 2023-12-18 RX ADMIN — FENTANYL CITRATE 25 MCG: 50 INJECTION, SOLUTION INTRAMUSCULAR; INTRAVENOUS at 08:02

## 2023-12-18 RX ADMIN — ONDANSETRON 4 MG: 2 INJECTION, SOLUTION INTRAMUSCULAR; INTRAVENOUS at 07:40

## 2023-12-18 RX ADMIN — LIDOCAINE HYDROCHLORIDE 40 MG: 20 INJECTION, SOLUTION EPIDURAL; INFILTRATION; INTRACAUDAL; PERINEURAL at 07:55

## 2023-12-18 RX ADMIN — MIDAZOLAM HYDROCHLORIDE 2 MG: 1 INJECTION, SOLUTION INTRAMUSCULAR; INTRAVENOUS at 07:45

## 2023-12-18 RX ADMIN — FENTANYL CITRATE 25 MCG: 50 INJECTION, SOLUTION INTRAMUSCULAR; INTRAVENOUS at 08:12

## 2023-12-18 SDOH — HEALTH STABILITY: MENTAL HEALTH: CURRENT SMOKER: 0

## 2023-12-18 ASSESSMENT — PAIN SCALES - GENERAL
PAINLEVEL_OUTOF10: 0 - NO PAIN

## 2023-12-18 ASSESSMENT — PAIN - FUNCTIONAL ASSESSMENT
PAIN_FUNCTIONAL_ASSESSMENT: 0-10

## 2023-12-18 ASSESSMENT — COLUMBIA-SUICIDE SEVERITY RATING SCALE - C-SSRS
1. IN THE PAST MONTH, HAVE YOU WISHED YOU WERE DEAD OR WISHED YOU COULD GO TO SLEEP AND NOT WAKE UP?: NO
2. HAVE YOU ACTUALLY HAD ANY THOUGHTS OF KILLING YOURSELF?: NO
6. HAVE YOU EVER DONE ANYTHING, STARTED TO DO ANYTHING, OR PREPARED TO DO ANYTHING TO END YOUR LIFE?: NO

## 2023-12-18 NOTE — ANESTHESIA PREPROCEDURE EVALUATION
Patient: Vannessa Soto    Procedure Information       Date/Time: 12/18/23 0830    Procedure: Excision Cyst Scalp    Location: [facility] / [facility]    Surgeons: Jagdeep ALEGRE MD            Relevant Problems   Cardiovascular   (+) Atypical chest pain   (+) Elevated lipoprotein(a)   (+) Hypercholesterolemia   (+) Hyperlipidemia   (+) Primary hypertension      Endocrine   (+) Class 2 obesity with body mass index (BMI) of 37.0 to 37.9 in adult   (+) Diabetes 1.5, managed as type 2 (CMS/HCC)   (+) Diabetes mellitus type 2 in obese (CMS/HCC)   (+) Obesity   (+) Type 2 diabetes mellitus with hyperosmolarity without coma, without long-term current use of insulin (CMS/HCC)      GI   (+) Gastroesophageal reflux disease without esophagitis      /Renal   (+) Hepatomegaly      Neuro/Psych   (+) Anxiety   (+) Bilateral carpal tunnel syndrome   (+) Current mild episode of major depressive disorder without prior episode (CMS/HCC)      Pulmonary   (+) Asthma   (+) Mild intermittent asthma without complication   (+) Other emphysema (CMS/HCC)   (+) Simple chronic bronchitis (CMS/HCC)      GI/Hepatic   (+) Cholelithiases      Musculoskeletal   (+) Bilateral carpal tunnel syndrome   (+) Degenerative joint disease involving multiple joints   (+) Osteoarthritis   (+) Osteoarthritis of shoulder      Other   (+) Shoulder arthritis       Clinical information reviewed:    Allergies                NPO Detail:  No data recorded     Physical Exam    Airway  Mallampati: II  TM distance: >3 FB  Neck ROM: full     Cardiovascular    Dental   (+) upper dentures     Pulmonary    Abdominal        Anesthesia Plan    ASA 3     MAC     The patient is not a current smoker.    intravenous induction   Anesthetic plan and risks discussed with patient.    Plan discussed with CRNA.

## 2023-12-18 NOTE — ANESTHESIA POSTPROCEDURE EVALUATION
Patient: Vannessa Soto    Procedure Summary       Date: 12/18/23 Room / [facility]: [facility]    Anesthesia Start: 0747 Anesthesia Stop:     Procedure: Excision Cyst Scalp (Head) Diagnosis:       Scalp cyst      (Scalp cyst [L72.9])    Surgeons: Jagdeep ALEGRE MD Responsible Provider: JUAN Singh    Anesthesia Type: MAC ASA Status: 3            Anesthesia Type: MAC    Vitals Value Taken Time   /71 12/18/23 0826   Temp 36.2 12/18/23 0826   Pulse 88 12/18/23 0826   Resp 14 12/18/23 0826   SpO2 96 12/18/23 0826       Anesthesia Post Evaluation    Patient location during evaluation: bedside  Patient participation: complete - patient participated  Level of consciousness: sleepy but conscious  Pain management: adequate  Airway patency: patent  Cardiovascular status: acceptable, blood pressure returned to baseline and hemodynamically stable  Respiratory status: room air and acceptable  Hydration status: acceptable  Postoperative Nausea and Vomiting: none      There were no known notable events for this encounter.

## 2023-12-18 NOTE — DISCHARGE INSTRUCTIONS
Moderate Sedation in Adults Discharge Instructions    About this topic  Moderate sedation is also known as conscious sedation. It changes your state of being awake or consciousness. With this sedation, you may feel slight pain or pressure during a procedure. The drugs help you to relax and may even allow you to sleep. It will be easy to wake you and you may talk and answer questions while under sedation. Most likely, you will not remember what happens while under this sedation.  What care is needed at home?  Ask your doctor what you need to do when you go home. Make sure you understand everything the doctor says. This way you will know what you need to do.  You will not be allowed to drive right away after the procedure. Ask a family member or a friend to drive you home.  Do not operate heavy or dangerous machinery for at least 24 hours.  Do not make major decisions or sign important papers for at least 24 hours. You may not be thinking clearly.  Avoid beer, wine, or mixed drinks (alcohol) for at least 24 hours.  You are at a higher risk of falling for at least 24 hours after moderate sedation.  Take extra care when you get up.  Do not change positions quickly.  Do not rush when you need to go to the bathroom or to answer the phone.  Ask for help if you feel unsteady when you try to walk.  Wear shoes with non-slip soles and low heels.  What follow-up care is needed?  Your doctor may ask you to make visits to the office to check on your progress. Be sure to keep these visits. Your doctor may also refer you to other doctors or tell you that you need more tests or care.  What drugs may be needed?  The doctor may order drugs to:  Help with pain  Treat an upset stomach or throwing up  Will physical activity be limited?  Rest for the day of the procedure. Avoid strenuous activities like heavy lifting and hard exercise. Talk to your doctor about whether you need to limit lifting or exercise after your procedure.  What  changes to diet are needed?  Start with a light diet when you are fully awake. This includes things that are easy to swallow like soups, pudding, jello, toast, and eggs. Slowly progress to your normal diet.  What problems could happen?  Low blood pressure  Breathing problems  Upset stomach or throwing up  Dizziness  When do I need to call the doctor?  Feel dizzy, weak, or tired  Faint  Very bad headache  Upset stomach or throwing up  To follow up for more tests or care  Teach Back: Helping You Understand  The Teach Back Method helps you understand the information we are giving you. After you talk with the staff, tell them in your own words what you learned. This helps to make sure the staff has described each thing clearly. It also helps to explain things that may have been confusing. Before going home, make sure you can do these:  I can tell you about my procedure.  I can tell you if I need more tests or care.  I can tell you what is good for me to eat and drink the next day.  I can tell you what I would do if I feel dizzy, weak, or tired.  Last Reviewed Date  2020-03-02  Consumer Information Use and Disclaimer  This generalized information is a limited summary of diagnosis, treatment, and/or medication information. It is not meant to be comprehensive and should be used as a tool to help the user understand and/or assess potential diagnostic and treatment options. It does NOT include all information about conditions, treatments, medications, side effects, or risks that may apply to a specific patient. It is not intended to be medical advice or a substitute for the medical advice, diagnosis, or treatment of a health care provider based on the health care provider's examination and assessment of a patient’s specific and unique circumstances. Patients must speak with a health care provider for complete information about their health, medical questions, and treatment options, including any risks or benefits regarding  "use of medications. This information does not endorse any treatments or medications as safe, effective, or approved for treating a specific patient. UpToDate, Inc. and its affiliates disclaim any warranty or liability relating to this information or the use thereof. The use of this information is governed by the Terms of Use, available at https://www.[employer].com/en/know/clinical-effectiveness-terms  Copyright © 2023 UpToDate, Inc. and its affiliates and/or licensors. All rights reserved.    Stitches and staples    The Basics  Written by the doctors and editors at [employer]  What are stitches? -- Stitches are a way doctors can close certain types of cuts. A doctor uses a special needle and thread to put in stitches. They sew the edges of the cut together and tie knots to hold the stitches in place (figure 1). The term doctors use for stitches is \"sutures.\"  There are 2 main types of stitches:  ?Absorbable - These stitches dissolve over time. They do not need to be taken out.  ?Non-absorbable - These stitches need to be taken out after a certain amount of time. They do not dissolve.  In some cases, like if you have a very deep cut, your doctor might give you stitches plus something called \"skin glue\" or \"tissue adhesive.\"  What are staples? -- Another way doctors can close cuts is with staples. Staples that go in the body are different from those used on paper. To put staples in, doctors use a special stapler (figure 2). Staples need to be taken out after a certain amount of time, just like non-absorbable stitches.  How do I know if I need stitches or staples? -- A doctor or nurse will have to look at your cut to decide. In general, you will need stitches or staples if your cut is wide, jagged, or goes deep enough through your skin. A cut will heal on its own without stitches or staples, but they help a cut heal faster and leave less of a scar.  Minor cuts and scrapes that do not go very deep usually do not need " stitches. If you get a cut and don't know if you need stitches, check with your doctor or nurse.  What happens when I get stitches or staples? -- Before the doctor stitches or staples your cut, they will clean out the cut well. They will also give you numbing medicine so that you don't feel pain when the stitches or staples go in.  After the doctor stitches or staples your cut, they will cover the area with gauze or a bandage.  Why is it important to take care of my stitches or staples? -- It's important to take care of your stitches or staples so that your cut heals well and doesn't get infected.  How do I take care of my stitches or staples? -- Your doctor or nurse will give you specific instructions, depending on the type of stitches you have and where they are. Staples need the same type of care as non-absorbable stitches.  Here is some general advice:  ?Keep your stitches or staples dry and covered with a bandage. Non-absorbable stitches and staples need to be kept dry for 1 to 2 days. Absorbable stitches sometimes need to be kept dry longer. Your doctor or nurse will tell you exactly how long to keep your stitches dry.  ?Once you no longer need to keep your stitches or staples dry, gently wash them with soap and water whenever you take a shower. Do not put your stitches or staples underwater, such as in a bath, pool, or lake. This can slow down healing and raise your chance of getting an infection.  ?After you wash your stitches or staples, pat them dry and put an antibiotic ointment on them  ?Cover your stitches or staples with a bandage or gauze, unless your doctor or nurse tells you not to  ?Avoid activities or sports that could hurt the area of your stitches or staples for 1 to 2 weeks. (Your doctor or nurse will tell you exactly how long to avoid these activities.) If you hurt the same part of your body again, stitches can break, and the cut can open up again.  When should I call the doctor or nurse? --  Call your doctor or nurse if:  ?Your stitches break or the cut opens up again  ?You get a fever  ?You have redness or swelling around the cut, or pus drains from the cut. It is normal for clear yellow fluid to drain from the cut in the first few days.  When will my stitches or staples be taken out? -- The doctor who puts in the stitches or staples will tell you when to see your doctor or nurse to have them taken out. Non-absorbable stitches usually stay in for 5 to 14 days, depending on where they are. Staples usually stay in for 7 to 10 days.  Staples need to be taken out with a special staple remover. But doctors' offices don't always have this device. The doctor who puts in your staples might give you a staple remover. If so, bring it to your doctor's office when you have your staples taken out.  What should I do after my stitches or staples are out? -- After your stitches or staples are out, you should protect the scar from the sun. Use sunscreen on the area or wear clothes or a hat that covers the scar.  Your doctor or nurse might also recommend that you use certain lotions or creams to help your scar heal.  All topics are updated as new evidence becomes available and our peer review process is complete.  This topic retrieved from [employer] on: Nov 28, 2023.  Topic 78065 Version 9.0  Release: 31.4.2 - C31.331  © 2023 UpToDate, Inc. and/or its affiliates. All rights reserved.  figure 1: Stitches    figure 2: Staples to close a cut    Consumer Information Use and Disclaimer  This generalized information is a limited summary of diagnosis, treatment, and/or medication information. It is not meant to be comprehensive and should be used as a tool to help the user understand and/or assess potential diagnostic and treatment options. It does NOT include all information about conditions, treatments, medications, side effects, or risks that may apply to a specific patient. It is not intended to be medical advice or a  substitute for the medical advice, diagnosis, or treatment of a health care provider based on the health care provider's examination and assessment of a patient's specific and unique circumstances. Patients must speak with a health care provider for complete information about their health, medical questions, and treatment options, including any risks or benefits regarding use of medications. This information does not endorse any treatments or medications as safe, effective, or approved for treating a specific patient. UpToDate, Inc. and its affiliates disclaim any warranty or liability relating to this information or the use thereof.The use of this information is governed by the Terms of Use, available at https://www.[employer].com/en/know/clinical-effectiveness-terms ©2023 UpToDate, Inc. and its affiliates and/or licensors. All rights reserved.  © 2023 UpToDate, Inc. and/or its affiliates. All rights reserved.

## 2023-12-18 NOTE — OP NOTE
Excision Cyst Scalp Operative Note     Date: 2023  OR Location: [facility]    Name: Vannessa Soto, : 1970, Age: 53 y.o., MRN: [id number], Sex: female    Diagnosis  Pre-op Diagnosis     * Scalp cyst [L72.9] Post-op Diagnosis     * Scalp cyst [L72.9]     Procedures  Excision Cyst Scalp  21123 - SC EXCISION TUMOR SOFT TISS FACE/SCALP SUBQ <2CM      Surgeons      * Jagdeep Ellsworth V - Primary    Resident/Fellow/Other Assistant:  Surgeon(s) and Role:    Procedure Summary  Anesthesia: Monitor Anesthesia Care  ASA: III  Anesthesia Staff: CRNA: CHAVO Singh-CRNA  Estimated Blood Loss: Minimal mL  Intra-op Medications: * No intraprocedure medications in log *        Specimen:   ID Type Source Tests Collected by Time   1 : SCALP MASS Tissue SOFT TISSUE MASS RESECTION SURGICAL PATHOLOGY EXAM Jagdeep ALEGRE MD 2023 0812        Staff:   Circulator: Sola Jonas RN  Scrub Person: Otilia Montgomery         Drains and/or Catheters: * None in log *    Tourniquet Times:         Implants:     Findings: Sebaceous cyst    Indications: Vannessa Soto is an 53 y.o. female who is having surgery for Scalp cyst [L72.9].     The patient was seen in the preoperative area. The risks, benefits, complications, treatment options, non-operative alternatives, expected recovery and outcomes were discussed with the patient. The possibilities of reaction to medication, pulmonary aspiration, injury to surrounding structures, bleeding, recurrent infection, the need for additional procedures, failure to diagnose a condition, and creating a complication requiring transfusion or operation were discussed with the patient. The patient concurred with the proposed plan, giving informed consent.  The site of surgery was properly noted/marked if necessary per policy. The patient has been actively warmed in preoperative area. Preoperative antibiotics are not indicated. Venous thrombosis prophylaxis have been ordered including bilateral  sequential compression devices and chemical prophylaxis    Procedure Details: Patient was brought to the OR laid supine.  Preoperative huddle was performed and all team members participated.  Patient was then sedated scalp area was then prepped and draped in the standard sterile surgical fashion no hair needed to be removed.  Preoperative timeout was performed.  Lesion was marked out local anesthetic was instilled skin incision made with a 15 blade flaps were raised sharply and the sebaceous cyst was excised in its entirety sharply and with cautery.  Hemostasis was checked.  Wound was closed with 3-0 nylon patient tolerated procedure well discussed with son  Complications:  None; patient tolerated the procedure well.    Disposition: PACU - hemodynamically stable.  Condition: stable         Additional Details:     Attending Attestation: I performed the procedure.    Jagdeep Ellsworth V  Phone Number: [phone]

## 2023-12-18 NOTE — H&P
History Of Present Illness  Vannessa Soto is a 53 y.o. female presenting with symptomatic scalp cyst.     Past Medical History  Past Medical History:   Diagnosis Date    Anxiety     Arthritis     COPD (chronic obstructive pulmonary disease) (CMS/[facility])     Diabetes mellitus (CMS/[facility])     GERD (gastroesophageal reflux disease)     Hemodialysis status (CMS/[facility])     Hyperlipidemia     Hypertension     Irritable bowel syndrome     Neuromuscular disorder (CMS/[facility])     Substance addiction (CMS/[facility])     12/15/23 states sober for 6 years    Wears glasses        Surgical History  Past Surgical History:   Procedure Laterality Date     SECTION, LOW TRANSVERSE      CHOLECYSTECTOMY      HYSTERECTOMY      TONSILLECTOMY          Social History  She reports that she quit smoking about 2 months ago. Her smoking use included cigarettes. She has a 17.50 pack-year smoking history. She has never used smokeless tobacco. She reports that she does not currently use alcohol. She reports that she does not currently use drugs.    Family History  Family History   Problem Relation Name Age of Onset    Lung cancer Mother      Coronary artery disease Mother      Diabetes type II Mother      Coronary artery disease Father      Diabetes type II Father      Other (sepsis) Sister      Other (chronic kidney disease) Other grandfather     Colon cancer Other grandfather     Seizures Sibling      Parkinsonism Sibling          Allergies  Patient has no known allergies.    Review of Systems   Skin:  Positive for rash.   All other systems reviewed and are negative.       Physical Exam  Skin:     Comments: 2 x 2 centimeter scalp cyst     Physical Exam  Constitutional:       Appearance: Normal appearance.   HENT:      Head: Normocephalic and atraumatic.      Mouth/Throat:      Pharynx: Oropharynx is clear.   Eyes:      Pupils: Pupils are equal, round, and reactive to light.   Cardiovascular:      Rate and Rhythm: Normal rate and regular rhythm.  "  Pulmonary:      Effort: Pulmonary effort is normal.      Breath sounds: Normal breath sounds.   Abdominal:      General: Abdomen is flat. Bowel sounds are normal.      Palpations: Abdomen is soft.   Musculoskeletal:         General: Normal range of motion.      Cervical back: Normal range of motion.   Skin:     General: Skin is warm.   Neurological:      General: No focal deficit present.      Mental Status: She is alert. Mental status is at baseline.   Psychiatric:         Mood and Affect: Mood normal.        Last Recorded Vitals  Blood pressure 132/68, pulse 73, temperature 35.7 °C (96.3 °F), temperature source Temporal, resp. rate 18, height 1.575 m (5' 2\"), weight 91.8 kg (202 lb 6.1 oz), SpO2 99 %.    Relevant Results             Assessment/Plan   Principal Problem:    Scalp cyst      Excision under MAC and local       I spent  minutes in the professional and overall care of this patient.      Jagdeep Ellsworth MD    "

## 2023-12-26 LAB
LABORATORY COMMENT REPORT: NORMAL
PATH REPORT.FINAL DX SPEC: NORMAL
PATH REPORT.GROSS SPEC: NORMAL
PATH REPORT.RELEVANT HX SPEC: NORMAL
PATH REPORT.TOTAL CANCER: NORMAL

## 2024-01-03 ENCOUNTER — OFFICE VISIT (OUTPATIENT)
Dept: SURGERY | Facility: CLINIC | Age: 54
End: 2024-01-03
Payer: COMMERCIAL

## 2024-01-03 ENCOUNTER — APPOINTMENT (OUTPATIENT)
Dept: SURGERY | Facility: CLINIC | Age: 54
End: 2024-01-03
Payer: COMMERCIAL

## 2024-01-03 DIAGNOSIS — L72.9 SCALP CYST: Primary | ICD-10-CM

## 2024-01-03 PROCEDURE — 1036F TOBACCO NON-USER: CPT | Performed by: SURGERY

## 2024-01-03 PROCEDURE — 99024 POSTOP FOLLOW-UP VISIT: CPT | Performed by: SURGERY

## 2024-01-03 NOTE — PROGRESS NOTES
Patient for follow-up after excision of scalp cyst no pain    Incision healed sutures removed    Pathology benign discussed with the patient    Follow-up as needed

## 2024-01-26 ENCOUNTER — HOSPITAL ENCOUNTER (OUTPATIENT)
Dept: NEUROLOGY | Facility: HOSPITAL | Age: 54
Discharge: HOME | End: 2024-01-26
Payer: COMMERCIAL

## 2024-01-26 DIAGNOSIS — G56.00 CARPAL TUNNEL SYNDROME: ICD-10-CM

## 2024-01-26 PROCEDURE — 95886 MUSC TEST DONE W/N TEST COMP: CPT

## 2024-01-26 NOTE — PROCEDURES
Impression:  This study reveals [facility] evidence of a neuropathic, axonal, sensorimotor process affecting the median nerves at the wrists bilaterally, consistent with the clinical diagnosis of bilateral carpal tunnel syndrome. These are of mild bilaterally by electrical criteria, perhaps slightly worse on the R side. Nocturnal splint use is recommended for prophylaxis. Carpal tunnel injection could be performed in my office on an outpatient basis for symptom control.  There is no suggestion by this study of more proximal processes e.g. radiculopathy, nor of more widespread processes e.g. peripheral neuropathy or mononeuritis multiplex.

## 2024-01-26 NOTE — PROCEDURES
Electromyography Laboratory Report       Accreditation with Exemplary Status       Name HAYLEE MARSHALL MRN [id number] Ref Provider VICK SOLANOOLMAN OROZ[employer] Technologist FATMATA Lopez    Gender Female Age 54 Years [employer] Physician Oswaldo Haider MD  Temp ºC 33    1970 Height 5 feet 2 inch Order No [id number] Study Date 2024 9:10 AM      Reason for Referral:   Carpal Tunnel Syndrome (CTS),     HAYLEE MARSHALL [id number] 2024 9:10 AM     1 of 1      Motor Nerve Conduction Study     Nerve/Sites Muscle Amplitude Latency Velocity F min     mV ms m/s ms     Right Left Ref. Right Left Ref. Right Left Ref. Right Left Ref.   Median - APB      Wrist APB 6.5 7.0 >=6.0 4.4 4.2 <=4.0    26.5 25.7 <=28.6      AC Fossa APB 6.4 6.9  7.9 8.2  55.7 50.0 >=50.0      Ulnar - ADM      Wrist ADM 12.6 10.6 >=7.0 2.9 2.9 <=3.1    25.1 25.3 <=28.8      B. Elbow ADM 11.6 9.1  5.3 5.3  61.1 59.0 >=50.0         A. Elbow ADM 11.5 9.4  7.1 7.4  55.2 49.0 >=50.0          Sensory Nerve Conduction Study     Nerve/Sites Rec. Site Amplitude Peak Lat Velocity     µV ms m/s     Right Left Ref. Right Left Ref. Right Left Ref.   Median      Wrist Index 13.3 10.6 >=15.0 5.1 4.8 <=3.6 39.4 38.9 >=50.0   Ulnar      Wrist Little 20.6 12.3 >=10.0 3.2 3.1 <=3.1 54.9 58.4 >=50.0   Radial      Forearm Snuff Box 26.9 21.8 >=14.0 2.6 2.5 <=2.7 62.3 62.3 >=50.0   Median, Ulnar - Palmar Mixed      Median  Palm Med Wr 21.8 20.9  3.1 3.1 <=2.2 32.7 32.7 >=49.0      Ulnar Palm Uln Wr 18.9 15.2  2.0 1.9 <=2.2 54.9 56.9 >=49.0                        EMG Summary Table     Spontaneous Voluntary Activity   Muscle Nerve Roots Ins Act Fibs/PSW Fasc Other Amp Dur Phases Recruitment Activation Notes   R. APB - Abd Poll Brev Median C8-T1 NL 0 0 - -1 +1 NL SL NL -   R. PT - Pron Teres Median C6-C7 NL 0 0 - NL NL NL NL Fair -   R. FDI - First Dors Int Ulnar C8-T1 NL 0 0 - NL NL NL NL NL -   R. FCU - Flex Carp Uln Ulnar C7-T1 NL 0 0 - NL NL NL NL NL -   R. TB - Triceps Brach  Radial C6-C8 NL 0 0 - NL NL NL NL NL -   R. BB - Biceps Brach Musculocutaneous C5-C6 NL 0 0 - NL NL NL NL NL -   R. MD - Deltoid (med) Axillary C5-C6 NL 0 0 - NL NL NL NL NL -   R. Cervical psp (low)  - NL 0 0 - NL NL NL NL NL -   R. Cervical psp (mid)  - NL 0 0 - NL NL NL NL NL -   R. Cervical psp (up)  - NL 0 0 - NL NL NL NL NL -   L. APB - Abd Poll Brev Median C8-T1 NL 0 0 - -1 +1 NL NL NL -   L. PT - Pron Teres Median C6-C7 NL 0 0 - NL NL NL NL NL -   L. FDI - First Dors Int Ulnar C8-T1 NL 0 0 - NL NL NL NL NL -   L. FCU - Flex Carp Uln Ulnar C7-T1 NL 0 0 - NL NL NL NL NL -   L. TB - Triceps Brach Radial C6-C8 NL 0 0 - NL NL NL NL NL -   L. BB - Biceps Brach Musculocutaneous C5-C6 NL 0 0 - NL NL NL NL NL -   L. MD - Deltoid (med) Axillary C5-C6 NL 0 0 - NL NL NL NL NL -   L. Cervical psp (low)  - NL 0 0 - NL NL NL NL NL -   L. Cervical psp (mid)  - NL 0 0 - NL NL NL NL NL -   L. Cervical psp (up)  - NL 0 0 - NL NL NL NL NL -             NL = Normal, Inc = Increased, Dec = Decreased, CRD = Complex Repetitive Discharge; SL = Slightly Decreased; MO = Moderately Decreased; MK = Markedly Decreased; N/+1, +1, +2, +3 = Borderline, Slightly, Moderately, Markedly Increased; N/-1, -1, -2, -3 = Borderline, Slightly, Moderately, Markedly Decreased, N/A = Not Applicable    Summary:     Impression:  This study reveals [employer] evidence of a neuropathic, axonal, sensorimotor process affecting the median nerves at the wrists bilaterally, consistent with the clinical diagnosis of bilateral carpal tunnel syndrome. These are of mild bilaterally by electrical criteria, perhaps slightly worse on the R side. Nocturnal splint use is recommended for prophylaxis. Carpal tunnel injection could be performed in my office on an outpatient basis for symptom control.  There is no suggestion by this study of more proximal processes e.g. radiculopathy, nor of more widespread processes e.g. peripheral neuropathy or mononeuritis multiplex.

## 2024-03-13 ENCOUNTER — HOSPITAL ENCOUNTER (OUTPATIENT)
Dept: LAB | Age: 54
Discharge: HOME | End: 2024-03-13
Payer: COMMERCIAL

## 2024-03-13 ENCOUNTER — OFFICE VISIT (OUTPATIENT)
Dept: GASTROENTEROLOGY | Age: 54
End: 2024-03-13
Payer: COMMERCIAL

## 2024-03-13 VITALS — BODY MASS INDEX: 37.73 KG/M2 | HEART RATE: 79 BPM | OXYGEN SATURATION: 95 % | HEIGHT: 62 IN | WEIGHT: 205 LBS

## 2024-03-13 DIAGNOSIS — R10.13 EPIGASTRIC PAIN: Primary | ICD-10-CM

## 2024-03-13 DIAGNOSIS — R10.13 EPIGASTRIC PAIN: ICD-10-CM

## 2024-03-13 LAB
ALBUMIN SERPL-MCNC: 4.3 G/DL (ref 3.5–4.6)
ALP SERPL-CCNC: 113 U/L (ref 40–130)
ALT SERPL-CCNC: 14 U/L (ref 0–33)
AST SERPL-CCNC: 16 U/L (ref 0–35)
BILIRUB DIRECT SERPL-MCNC: <0.2 MG/DL (ref 0–0.4)
BILIRUB INDIRECT SERPL-MCNC: NORMAL MG/DL (ref 0–0.6)
BILIRUB SERPL-MCNC: 0.4 MG/DL (ref 0.2–0.7)
LIPASE SERPL-CCNC: 30 U/L (ref 12–95)
PROT SERPL-MCNC: 6.7 G/DL (ref 6.3–8)

## 2024-03-13 PROCEDURE — 4004F PT TOBACCO SCREEN RCVD TLK: CPT | Performed by: SPECIALIST

## 2024-03-13 PROCEDURE — 83690 ASSAY OF LIPASE: CPT

## 2024-03-13 PROCEDURE — G8484 FLU IMMUNIZE NO ADMIN: HCPCS | Performed by: SPECIALIST

## 2024-03-13 PROCEDURE — G8417 CALC BMI ABV UP PARAM F/U: HCPCS | Performed by: SPECIALIST

## 2024-03-13 PROCEDURE — 3017F COLORECTAL CA SCREEN DOC REV: CPT | Performed by: SPECIALIST

## 2024-03-13 PROCEDURE — 36415 COLL VENOUS BLD VENIPUNCTURE: CPT

## 2024-03-13 PROCEDURE — 99203 OFFICE O/P NEW LOW 30 MIN: CPT | Performed by: SPECIALIST

## 2024-03-13 PROCEDURE — G8427 DOCREV CUR MEDS BY ELIG CLIN: HCPCS | Performed by: SPECIALIST

## 2024-03-13 PROCEDURE — 80076 HEPATIC FUNCTION PANEL: CPT

## 2024-03-13 RX ORDER — DULAGLUTIDE 1.5 MG/.5ML
INJECTION, SOLUTION SUBCUTANEOUS
COMMUNITY
Start: 2023-10-17

## 2024-03-13 RX ORDER — NAPROXEN 500 MG/1
TABLET ORAL
COMMUNITY
Start: 2023-03-06

## 2024-03-13 RX ORDER — ACYCLOVIR 400 MG/1
TABLET ORAL
COMMUNITY
Start: 2024-03-04

## 2024-03-13 RX ORDER — PANTOPRAZOLE SODIUM 40 MG/1
1 TABLET, DELAYED RELEASE ORAL 2 TIMES DAILY
COMMUNITY
Start: 2022-05-24

## 2024-03-13 RX ORDER — EMPAGLIFLOZIN 25 MG/1
25 TABLET, FILM COATED ORAL DAILY
COMMUNITY
Start: 2022-05-24

## 2024-03-13 RX ORDER — FLUTICASONE PROPIONATE 50 MCG
2 SPRAY, SUSPENSION (ML) NASAL DAILY
COMMUNITY
Start: 2022-01-25

## 2024-03-13 RX ORDER — HYDROXYZINE PAMOATE 25 MG/1
25 CAPSULE ORAL 3 TIMES DAILY PRN
COMMUNITY
Start: 2023-04-28

## 2024-03-13 RX ORDER — ALBUTEROL SULFATE 90 UG/1
2 AEROSOL, METERED RESPIRATORY (INHALATION) EVERY 4 HOURS PRN
COMMUNITY
Start: 2021-11-19

## 2024-03-13 RX ORDER — FAMOTIDINE 20 MG/1
TABLET, FILM COATED ORAL
COMMUNITY
Start: 2023-12-07 | End: 2024-03-13

## 2024-03-13 ASSESSMENT — ENCOUNTER SYMPTOMS
EYES NEGATIVE: 1
ABDOMINAL PAIN: 1
CONSTIPATION: 0
RECTAL PAIN: 0
ABDOMINAL DISTENTION: 0
DIARRHEA: 0
ANAL BLEEDING: 0
RESPIRATORY NEGATIVE: 1
VOMITING: 0
NAUSEA: 0
BLOOD IN STOOL: 0

## 2024-03-13 NOTE — PROGRESS NOTES
Gastroenterology Clinic Visit    Larissa Torres  [id number]  Chief Complaint   Patient presents with    New Patient     Gastro-esophageal reflex disease without esophagitis    Constipation     No BP       HPI: 54 y.o. female presents to the clinic with history of abdominal pain since last 6 to 7 months.  Pain is in the epigastric and periumbilical area without any radiation.  Feels nauseous but no emesis, patient had a cholecystectomy about 2 years ago and she describes that her symptoms are similar to what she was experiencing at that time.  Patient feels bloated.  Patient has symptoms of GERD and has been on Protonix which controls heartburn.  No dysphagia, patient has some symptoms of early satiety since she has been on Trulicity.  Patient also takes Naprosyn.  Patient was on Ozempic earlier and was experiencing some GI symptoms for the first 1 or 2 days after the injection . no history of hematemesis or melena, patient also has history of chronic constipation and has a BM every 5 or 6 days.  Social history does not smoke currently, no drink alcohol.    Review of Systems   Constitutional: Negative.    HENT: Negative.     Eyes: Negative.    Respiratory: Negative.     Cardiovascular: Negative.         No cardiac issues   Gastrointestinal:  Positive for abdominal pain. Negative for abdominal distention, anal bleeding, blood in stool, constipation, diarrhea, nausea, rectal pain and vomiting.   Endocrine: Negative.         Diabetes type 2   Genitourinary: Negative.    Musculoskeletal: Negative.    Skin: Negative.    Allergic/Immunologic: Negative for food allergies.   Neurological: Negative.    Hematological: Negative.    Psychiatric/Behavioral: Negative.          No past medical history on file.   Past Surgical History:   Procedure Laterality Date    HYSTERECTOMY (CERVIX STATUS UNKNOWN)      OVARY REMOVAL       Current Outpatient Medications on File Prior to Visit   Medication Sig Dispense Refill    albuterol sulfate

## 2024-03-14 ENCOUNTER — PREP FOR PROCEDURE (OUTPATIENT)
Dept: GASTROENTEROLOGY | Age: 54
End: 2024-03-14

## 2024-03-14 ENCOUNTER — TELEPHONE (OUTPATIENT)
Dept: GASTROENTEROLOGY | Age: 54
End: 2024-03-14

## 2024-03-14 DIAGNOSIS — R10.13 EPIGASTRIC PAIN: ICD-10-CM

## 2024-03-14 DIAGNOSIS — R10.13 EPIGASTRIC PAIN: Primary | ICD-10-CM

## 2024-03-14 RX ORDER — SODIUM CHLORIDE 9 MG/ML
INJECTION, SOLUTION INTRAVENOUS CONTINUOUS
Status: CANCELLED | OUTPATIENT
Start: 2024-03-14

## 2024-03-14 RX ORDER — SODIUM CHLORIDE 9 MG/ML
INJECTION, SOLUTION INTRAVENOUS PRN
Status: CANCELLED | OUTPATIENT
Start: 2024-03-14

## 2024-03-14 RX ORDER — SODIUM CHLORIDE 0.9 % (FLUSH) 0.9 %
5-40 SYRINGE (ML) INJECTION EVERY 12 HOURS SCHEDULED
Status: CANCELLED | OUTPATIENT
Start: 2024-03-14

## 2024-03-14 RX ORDER — SODIUM CHLORIDE 0.9 % (FLUSH) 0.9 %
5-40 SYRINGE (ML) INJECTION PRN
Status: CANCELLED | OUTPATIENT
Start: 2024-03-14

## 2024-03-14 NOTE — TELEPHONE ENCOUNTER
Dr. Shane would you like the patient to schedule an EGD on 3/22/24, we would need an order in Epic.Your note said you were going to check on her labs first.

## 2024-03-14 NOTE — TELEPHONE ENCOUNTER
The patient is scheduled for an EGD on 3/22/24, Dr. Shane would like to confirm the patient can hold her medication Trulicity until the procedure. Please confirm with the doctor that prescribes this medication.Thank You

## 2024-03-22 ENCOUNTER — ANESTHESIA EVENT (OUTPATIENT)
Dept: ENDOSCOPY | Age: 54
End: 2024-03-22
Payer: COMMERCIAL

## 2024-03-22 ENCOUNTER — ANESTHESIA (OUTPATIENT)
Dept: ENDOSCOPY | Age: 54
End: 2024-03-22
Payer: COMMERCIAL

## 2024-03-22 ENCOUNTER — HOSPITAL ENCOUNTER (OUTPATIENT)
Age: 54
Setting detail: OUTPATIENT SURGERY
Discharge: HOME | End: 2024-03-22
Attending: SPECIALIST | Admitting: SPECIALIST
Payer: COMMERCIAL

## 2024-03-22 VITALS
HEART RATE: 72 BPM | TEMPERATURE: 97.5 F | HEIGHT: 62 IN | BODY MASS INDEX: 37.36 KG/M2 | DIASTOLIC BLOOD PRESSURE: 67 MMHG | WEIGHT: 203 LBS | OXYGEN SATURATION: 97 % | RESPIRATION RATE: 16 BRPM | SYSTOLIC BLOOD PRESSURE: 135 MMHG

## 2024-03-22 DIAGNOSIS — R10.13 EPIGASTRIC PAIN: ICD-10-CM

## 2024-03-22 LAB
GLUCOSE BLD-MCNC: 139 MG/DL (ref 70–99)
PERFORMED ON: ABNORMAL

## 2024-03-22 PROCEDURE — 3609017100 HC EGD: Performed by: SPECIALIST

## 2024-03-22 PROCEDURE — 2580000003 HC RX 258: Performed by: SPECIALIST

## 2024-03-22 PROCEDURE — 3700000000 HC ANESTHESIA ATTENDED CARE: Performed by: SPECIALIST

## 2024-03-22 PROCEDURE — 2580000003 HC RX 258

## 2024-03-22 PROCEDURE — 6370000000 HC RX 637 (ALT 250 FOR IP): Performed by: SPECIALIST

## 2024-03-22 PROCEDURE — 7100000010 HC PHASE II RECOVERY - FIRST 15 MIN: Performed by: SPECIALIST

## 2024-03-22 PROCEDURE — 88305 TISSUE EXAM BY PATHOLOGIST: CPT

## 2024-03-22 PROCEDURE — 43239 EGD BIOPSY SINGLE/MULTIPLE: CPT | Performed by: SPECIALIST

## 2024-03-22 PROCEDURE — 2709999900 HC NON-CHARGEABLE SUPPLY: Performed by: SPECIALIST

## 2024-03-22 PROCEDURE — 2500000003 HC RX 250 WO HCPCS

## 2024-03-22 PROCEDURE — 7100000011 HC PHASE II RECOVERY - ADDTL 15 MIN: Performed by: SPECIALIST

## 2024-03-22 PROCEDURE — 3700000001 HC ADD 15 MINUTES (ANESTHESIA): Performed by: SPECIALIST

## 2024-03-22 PROCEDURE — 88342 IMHCHEM/IMCYTCHM 1ST ANTB: CPT

## 2024-03-22 PROCEDURE — 6360000002 HC RX W HCPCS

## 2024-03-22 RX ORDER — SIMETHICONE 40MG/0.6ML
SUSPENSION, DROPS(FINAL DOSAGE FORM)(ML) ORAL PRN
Status: DISCONTINUED | OUTPATIENT
Start: 2024-03-22 | End: 2024-03-22 | Stop reason: ALTCHOICE

## 2024-03-22 RX ORDER — SODIUM CHLORIDE 0.9 % (FLUSH) 0.9 %
5-40 SYRINGE (ML) INJECTION PRN
Status: DISCONTINUED | OUTPATIENT
Start: 2024-03-22 | End: 2024-03-22 | Stop reason: HOSPADM

## 2024-03-22 RX ORDER — PROPOFOL 10 MG/ML
INJECTION, EMULSION INTRAVENOUS PRN
Status: DISCONTINUED | OUTPATIENT
Start: 2024-03-22 | End: 2024-03-22 | Stop reason: SDUPTHER

## 2024-03-22 RX ORDER — MAGNESIUM HYDROXIDE 1200 MG/15ML
LIQUID ORAL PRN
Status: DISCONTINUED | OUTPATIENT
Start: 2024-03-22 | End: 2024-03-22 | Stop reason: ALTCHOICE

## 2024-03-22 RX ORDER — SODIUM CHLORIDE 9 MG/ML
INJECTION, SOLUTION INTRAVENOUS
Status: COMPLETED
Start: 2024-03-22 | End: 2024-03-22

## 2024-03-22 RX ORDER — SODIUM CHLORIDE 9 MG/ML
INJECTION, SOLUTION INTRAVENOUS PRN
Status: DISCONTINUED | OUTPATIENT
Start: 2024-03-22 | End: 2024-03-22 | Stop reason: HOSPADM

## 2024-03-22 RX ORDER — LIDOCAINE HYDROCHLORIDE 20 MG/ML
INJECTION, SOLUTION INFILTRATION; PERINEURAL PRN
Status: DISCONTINUED | OUTPATIENT
Start: 2024-03-22 | End: 2024-03-22 | Stop reason: SDUPTHER

## 2024-03-22 RX ORDER — SODIUM CHLORIDE 9 MG/ML
INJECTION, SOLUTION INTRAVENOUS CONTINUOUS
Status: DISCONTINUED | OUTPATIENT
Start: 2024-03-22 | End: 2024-03-22 | Stop reason: HOSPADM

## 2024-03-22 RX ORDER — SODIUM CHLORIDE 0.9 % (FLUSH) 0.9 %
5-40 SYRINGE (ML) INJECTION EVERY 12 HOURS SCHEDULED
Status: DISCONTINUED | OUTPATIENT
Start: 2024-03-22 | End: 2024-03-22 | Stop reason: HOSPADM

## 2024-03-22 RX ADMIN — PROPOFOL 50 MG: 10 INJECTION, EMULSION INTRAVENOUS at 13:20

## 2024-03-22 RX ADMIN — PROPOFOL 50 MG: 10 INJECTION, EMULSION INTRAVENOUS at 13:14

## 2024-03-22 RX ADMIN — PROPOFOL 50 MG: 10 INJECTION, EMULSION INTRAVENOUS at 13:13

## 2024-03-22 RX ADMIN — PROPOFOL 100 MG: 10 INJECTION, EMULSION INTRAVENOUS at 13:11

## 2024-03-22 RX ADMIN — SODIUM CHLORIDE: 9 INJECTION, SOLUTION INTRAVENOUS at 12:03

## 2024-03-22 RX ADMIN — LIDOCAINE HYDROCHLORIDE 3 ML: 20 INJECTION, SOLUTION INFILTRATION; PERINEURAL at 13:11

## 2024-03-22 RX ADMIN — PROPOFOL 50 MG: 10 INJECTION, EMULSION INTRAVENOUS at 13:17

## 2024-03-22 ASSESSMENT — PAIN - FUNCTIONAL ASSESSMENT
PAIN_FUNCTIONAL_ASSESSMENT: NONE - DENIES PAIN
PAIN_FUNCTIONAL_ASSESSMENT: NONE - DENIES PAIN
PAIN_FUNCTIONAL_ASSESSMENT: 0-10

## 2024-03-22 ASSESSMENT — COPD QUESTIONNAIRES: CAT_SEVERITY: NO INTERVAL CHANGE

## 2024-03-22 ASSESSMENT — PAIN DESCRIPTION - DESCRIPTORS: DESCRIPTORS: OTHER (COMMENT)

## 2024-03-22 NOTE — H&P
Patient Name: Larissa Torres  : 1970  MRN: [id number]  DATE: 24      ENDOSCOPY  History and Physical    Procedure:    [] Diagnostic Colonoscopy       [] Screening Colonoscopy  [x] EGD      [] ERCP      [] EUS       [] Other    [x] Previous office notes/History and Physical reviewed from the patients chart. Please see EMR for further details of HPI. I have examined the patient's status immediately prior to the procedure and:      Indications/HPI:    [x]Abdominal Pain  []Cancer- GI/Lung  []Fhx of colon CA/polyps  []History of Polyps  []Dixon’s   []Melena  []Abnormal Imaging  []Dysphagia    []Persistent Pneumonia  []Anemia  []Food Impaction  []History of Polyps  []GI Bleed  []Pulmonary nodule/Mass  []Change in bowel habits []Heartburn/Reflux  []Rectal Bleed (BRBPR)  []Chest Pain - Non Cardiac []Heme (+) Stoo  l[]Ulcers  []Constipation  []Hemoptysis   []Varices  []Diarrhea  []Hypoxemia  []Nausea/Vomiting  []Screening   []Crohns/Colitis  []Other:     Anesthesia:   [x] MAC [] Moderate Sedation   [] General   [] None     ROS: 12 pt Review of Symptoms was negative unless mentioned above    Medications:   Prior to Admission medications    Medication Sig Start Date End Date Taking? Authorizing Provider   albuterol sulfate HFA (PROVENTIL;VENTOLIN;PROAIR) 108 (90 Base) MCG/ACT inhaler Inhale 2 puffs into the lungs every 4 hours as needed 21   Meche Rios MD   Continuous Blood Gluc Sensor (DEXCOM G7 SENSOR) MISC  3/4/24   Meche Rios MD   diclofenac sodium (VOLTAREN) 1 % GEL Apply 1 Application topically daily 22   Meche Rios MD   TRULICITY 1.5 MG/0.5ML SC injection Takes on Thursdays 10/17/23   Meche Rios MD   JARDIANCE 25 MG tablet Take 1 tablet by mouth daily 22   Meche Rios MD   fluticasone (FLONASE) 50 MCG/ACT nasal spray 2 sprays daily  Patient not taking: Reported on 3/22/2024 1/25/22   Meche Rios MD   hydrOXYzine pamoate

## 2024-03-22 NOTE — ANESTHESIA POSTPROCEDURE EVALUATION
Department of Anesthesiology  Postprocedure Note    Patient: Larissa Torres  MRN: [id number]  YOB: 1970  Date of evaluation: 3/22/2024    Procedure Summary       Date: 03/22/24 Room / Location: [facility] OR 01 / [facility]    Anesthesia Start: 1307 Anesthesia Stop: 1323    Procedure: ESOPHAGOGASTRODUODENOSCOPY WITH BIOPSIES Diagnosis:       Epigastric pain      (Epigastric pain [R10.13])    Surgeons: Dg Shane MD Responsible Provider: Judy Gentile APRN - CRNA    Anesthesia Type: MAC ASA Status: 3            Anesthesia Type: No value filed.    Veena Phase I: Veena Score: 10    Veena Phase II:      Anesthesia Post Evaluation    Patient location during evaluation: bedside  Patient participation: complete - patient participated  Level of consciousness: awake and awake and alert  Airway patency: patent  Nausea & Vomiting: no nausea and no vomiting  Cardiovascular status: blood pressure returned to baseline and hemodynamically stable  Respiratory status: acceptable  Hydration status: euvolemic  Pain management: adequate        No notable events documented.

## 2024-03-22 NOTE — ANESTHESIA PRE PROCEDURE
\"HGB\", \"HCT\", \"MCV\", \"RDW\", \"PLT\"    CMP:   Lab Results   Component Value Date/Time    PROT 6.7 03/13/2024 03:49 PM    BILITOT 0.4 03/13/2024 03:49 PM    ALKPHOS 113 03/13/2024 03:49 PM    AST 16 03/13/2024 03:49 PM    ALT 14 03/13/2024 03:49 PM       POC Tests: No results for input(s): \"POCGLU\", \"POCNA\", \"POCK\", \"POCCL\", \"POCBUN\", \"POCHEMO\", \"POCHCT\" in the last 72 hours.    Coags: No results found for: \"PROTIME\", \"INR\", \"APTT\"    HCG (If Applicable): No results found for: \"PREGTESTUR\", \"PREGSERUM\", \"HCG\", \"HCGQUANT\"     ABGs: No results found for: \"PHART\", \"PO2ART\", \"OES2BIJ\", \"OKZ6OXG\", \"BEART\", \"Y7HAQQLT\"     Type & Screen (If Applicable):  No results found for: \"LABABO\", \"LABRH\"    Drug/Infectious Status (If Applicable):  No results found for: \"HIV\", \"HEPCAB\"    COVID-19 Screening (If Applicable): No results found for: \"COVID19\"        Anesthesia Evaluation  Patient summary reviewed and Nursing notes reviewed  Airway: Mallampati: II  TM distance: >3 FB   Neck ROM: full  Mouth opening: > = 3 FB   Dental: normal exam         Pulmonary:normal exam    (+)  COPD: no interval change,          asthma:                            Cardiovascular:Negative CV ROS  Exercise tolerance: good (>4 METS)  (+) hyperlipidemia         Beta Blocker:  Not on Beta Blocker         Neuro/Psych:               GI/Hepatic/Renal:   (+) GERD: no interval change, morbid obesity          Endo/Other:    (+) DiabetesType II DM, no interval change.          Pt had no PAT visit       Abdominal: normal exam            Vascular:          Other Findings:             Anesthesia Plan      MAC     ASA 3       Induction: intravenous.      Anesthetic plan and risks discussed with patient.    Use of blood products discussed with patient whom consented to blood products.    Plan discussed with attending.                    JENNIFER Pisano - CRNA   3/22/2024

## 2024-06-03 ENCOUNTER — APPOINTMENT (OUTPATIENT)
Dept: RADIOLOGY | Facility: HOSPITAL | Age: 54
End: 2024-06-03
Payer: COMMERCIAL

## 2024-06-03 ENCOUNTER — HOSPITAL ENCOUNTER (EMERGENCY)
Facility: HOSPITAL | Age: 54
Discharge: HOME | End: 2024-06-03
Payer: COMMERCIAL

## 2024-06-03 VITALS
HEART RATE: 70 BPM | DIASTOLIC BLOOD PRESSURE: 81 MMHG | BODY MASS INDEX: 37.36 KG/M2 | TEMPERATURE: 97 F | SYSTOLIC BLOOD PRESSURE: 170 MMHG | RESPIRATION RATE: 18 BRPM | WEIGHT: 203 LBS | HEIGHT: 62 IN | OXYGEN SATURATION: 97 %

## 2024-06-03 DIAGNOSIS — H66.91 RIGHT OTITIS MEDIA, UNSPECIFIED OTITIS MEDIA TYPE: Primary | ICD-10-CM

## 2024-06-03 PROCEDURE — 2500000001 HC RX 250 WO HCPCS SELF ADMINISTERED DRUGS (ALT 637 FOR MEDICARE OP): Performed by: PHYSICIAN ASSISTANT

## 2024-06-03 PROCEDURE — 70450 CT HEAD/BRAIN W/O DYE: CPT

## 2024-06-03 PROCEDURE — 70450 CT HEAD/BRAIN W/O DYE: CPT | Performed by: RADIOLOGY

## 2024-06-03 PROCEDURE — 99284 EMERGENCY DEPT VISIT MOD MDM: CPT | Mod: 25 | Performed by: PHYSICIAN ASSISTANT

## 2024-06-03 RX ORDER — AMOXICILLIN AND CLAVULANATE POTASSIUM 875; 125 MG/1; MG/1
1 TABLET, FILM COATED ORAL ONCE
Status: COMPLETED | OUTPATIENT
Start: 2024-06-03 | End: 2024-06-03

## 2024-06-03 RX ORDER — ACETAMINOPHEN 325 MG/1
975 TABLET ORAL ONCE
Status: COMPLETED | OUTPATIENT
Start: 2024-06-03 | End: 2024-06-03

## 2024-06-03 RX ORDER — AMOXICILLIN AND CLAVULANATE POTASSIUM 875; 125 MG/1; MG/1
1 TABLET, FILM COATED ORAL EVERY 12 HOURS
Qty: 20 TABLET | Refills: 0 | Status: SHIPPED | OUTPATIENT
Start: 2024-06-03 | End: 2024-06-13

## 2024-06-03 RX ADMIN — AMOXICILLIN AND CLAVULANATE POTASSIUM 1 TABLET: 875; 125 TABLET, FILM COATED ORAL at 21:37

## 2024-06-03 RX ADMIN — ACETAMINOPHEN 975 MG: 325 TABLET ORAL at 20:46

## 2024-06-03 ASSESSMENT — COLUMBIA-SUICIDE SEVERITY RATING SCALE - C-SSRS
6. HAVE YOU EVER DONE ANYTHING, STARTED TO DO ANYTHING, OR PREPARED TO DO ANYTHING TO END YOUR LIFE?: NO
2. HAVE YOU ACTUALLY HAD ANY THOUGHTS OF KILLING YOURSELF?: NO
1. IN THE PAST MONTH, HAVE YOU WISHED YOU WERE DEAD OR WISHED YOU COULD GO TO SLEEP AND NOT WAKE UP?: NO

## 2024-06-03 ASSESSMENT — PAIN SCALES - GENERAL
PAINLEVEL_OUTOF10: 9
PAINLEVEL_OUTOF10: 0 - NO PAIN

## 2024-06-03 ASSESSMENT — LIFESTYLE VARIABLES
HAVE YOU EVER FELT YOU SHOULD CUT DOWN ON YOUR DRINKING: NO
HAVE PEOPLE ANNOYED YOU BY CRITICIZING YOUR DRINKING: NO
EVER HAD A DRINK FIRST THING IN THE MORNING TO STEADY YOUR NERVES TO GET RID OF A HANGOVER: NO
EVER FELT BAD OR GUILTY ABOUT YOUR DRINKING: NO
TOTAL SCORE: 0

## 2024-06-03 ASSESSMENT — PAIN DESCRIPTION - ORIENTATION: ORIENTATION: RIGHT

## 2024-06-03 ASSESSMENT — PAIN DESCRIPTION - LOCATION: LOCATION: EAR

## 2024-06-03 ASSESSMENT — PAIN DESCRIPTION - DESCRIPTORS: DESCRIPTORS: THROBBING

## 2024-06-03 ASSESSMENT — PAIN - FUNCTIONAL ASSESSMENT: PAIN_FUNCTIONAL_ASSESSMENT: 0-10

## 2024-06-03 ASSESSMENT — PAIN DESCRIPTION - PAIN TYPE: TYPE: ACUTE PAIN

## 2024-06-04 NOTE — ED PROVIDER NOTES
HPI   Chief Complaint   Patient presents with    Earache     Pt c/o right sided ear pain. States she was diagnosed with an ear infection almost 2 months ago, has been on 2 antibiotics and has not had any improvement. Is to see ENT on the  but wanted to be seen because the pain is getting worse       54-year-old female with history of diabetes presenting to the ED today with 2-month history of right ear pain.  There was no fall or injury, she states initially she was seen and was diagnosed with ear infection and put on an antibiotic.  Her pain seemed to get a little bit better but was still there.  She saw her PCP within the last 2 weeks and they put her on a course of amoxicillin and she is just about to finish.  She states she is still having the ear pain and is scheduled to see ENT next Thursday, .  She states has been taking Tylenol and Motrin and would like something stronger for the pain.  She has not had fevers headache or neck pain.  She has not had tongue or lip swelling or difficulty swallowing or breathing.  She has not had any congestion or runny nose.  She denies dizziness, nausea or vomiting or any further complaints.      History provided by:  Patient                      Roxana Coma Scale Score: 15                     Patient History   Past Medical History:   Diagnosis Date    Anxiety     Arthritis     COPD (chronic obstructive pulmonary disease) (Multi)     Diabetes mellitus (Multi)     GERD (gastroesophageal reflux disease)     Hemodialysis status (CMS-[facility])     Hyperlipidemia     Hypertension     Irritable bowel syndrome     Neuromuscular disorder (Multi)     Substance addiction (Multi)     12/15/23 states sober for 6 years    Wears glasses      Past Surgical History:   Procedure Laterality Date     SECTION, LOW TRANSVERSE      CHOLECYSTECTOMY      HYSTERECTOMY      TONSILLECTOMY       Family History   Problem Relation Name Age of Onset    Lung cancer Mother      Coronary artery  "disease Mother      Diabetes type II Mother      Coronary artery disease Father      Diabetes type II Father      Other (sepsis) Sister      Other (chronic kidney disease) Other grandfather     Colon cancer Other grandfather     Seizures Sibling      Parkinsonism Sibling       Social History     Tobacco Use    Smoking status: Former     Current packs/day: 0.00     Average packs/day: 0.5 packs/day for 35.0 years (17.5 ttl pk-yrs)     Types: Cigarettes     Start date: 10/1988     Quit date: 10/2023     Years since quittin.6    Smokeless tobacco: Never   Vaping Use    Vaping status: Some Days    Substances: Nicotine    Devices: [employer] R[employer] tank   Substance Use Topics    Alcohol use: Not Currently    Drug use: Not Currently     Comment: h/o substance abuse -years ago \"crack\"       Physical Exam   ED Triage Vitals [24]   Temperature Heart Rate Respirations BP   36.1 °C (97 °F) 72 18 (!) 189/127      Pulse Ox Temp Source Heart Rate Source Patient Position   97 % Temporal Monitor --      BP Location FiO2 (%)     -- --       Physical Exam  HENT:      Head: Normocephalic and atraumatic.      Right Ear: Ear canal and external ear normal.      Left Ear: Tympanic membrane, ear canal and external ear normal.      Ears:      Comments: Right TM with purulent effusion.  No TM rupture.     Nose: Nose normal.      Mouth/Throat:      Mouth: Mucous membranes are moist.      Pharynx: Oropharynx is clear. No oropharyngeal exudate or posterior oropharyngeal erythema.   Eyes:      Extraocular Movements: Extraocular movements intact.      Conjunctiva/sclera: Conjunctivae normal.      Pupils: Pupils are equal, round, and reactive to light.   Neck:      Comments: No edema or crepitus throughout the neck.  Cardiovascular:      Rate and Rhythm: Normal rate and regular rhythm.      Pulses: Normal pulses.      Heart sounds: Normal heart sounds.   Pulmonary:      Effort: Pulmonary effort is normal.      Breath sounds: Normal breath " sounds.   Musculoskeletal:      Cervical back: Normal range of motion and neck supple. No rigidity or tenderness.      Comments: Normal gait and strength tone.  No facial tenderness, erythema or edema.  No pre or postauricular tenderness, erythema or edema.   Lymphadenopathy:      Cervical: No cervical adenopathy.   Skin:     General: Skin is warm.   Neurological:      Mental Status: She is alert and oriented to person, place, and time.         ED Course & MDM   Diagnoses as of 06/03/24 2129   Right otitis media, unspecified otitis media type       Medical Decision Making  54-year-old female with a history of diabetes presenting to the ER today with 2-month history of right ear pain.  She was initially on a course of antibiotics, her pain got a little bit better at that time.  Now she saw her PCP and was started on amoxicillin which she is about to finish and states she is still having the ear pain.  She is scheduled to see ENT on 6/13.  She denies fever, headache or congestion or neck pain.  No tongue or lip swelling or difficulty swallowing or breathing.  She has not had any discharge from the ear.  She has no further complaints and arrives afebrile with stable vital signs.  On exam heart RRR, lungs are clear.  She has full range of motion to the neck without any edema or lymphadenopathy or tenderness.  There is no pre or postauricular tenderness or erythema or edema.  The right TM is with purulent effusion but there is no TM rupture or hemotympanum.  Right EAC is clear, no discharge or edema.  The left TM and EAC are normal in appearance.  Her exam is otherwise within normal limits.  Patient is driving herself home today so I did order Tylenol and a CT.    CT head demonstrates no acute intracranial abnormality and there is a right mastoid effusion.  She is not having any postauricular or preauricular tenderness or erythema or edema.  I discussed with patient we will start her on Augmentin and she can continue  with Tylenol Motrin for pain relief at home.  She is already scheduled to see ENT but she states that she would like to see them sooner so I encouraged her to call our office again in the morning to schedule with ENT or with the PA for this week.  I also outlined warning signs to return to the ED and she expressed understanding and agreed with the plan of care today.    Labs Reviewed - No data to display    CT head wo IV contrast   Final Result   No acute intracranial abnormality. Consider follow-up with MRI as   warranted.        Right mastoid effusion.        Signed by: Kelvin Griffiths 6/3/2024 9:19 PM   Dictation workstation:   [id number]            Procedure  Procedures     Latia Mendoza PA-C  06/03/24 2139

## 2024-06-13 ENCOUNTER — APPOINTMENT (OUTPATIENT)
Dept: OTOLARYNGOLOGY | Facility: CLINIC | Age: 54
End: 2024-06-13
Payer: COMMERCIAL

## 2024-06-13 DIAGNOSIS — H60.551 ACUTE REACTIVE OTITIS EXTERNA OF RIGHT EAR: Primary | ICD-10-CM

## 2024-06-13 PROCEDURE — 99203 OFFICE O/P NEW LOW 30 MIN: CPT | Performed by: PHYSICIAN ASSISTANT

## 2024-06-13 RX ORDER — CIPROFLOXACIN AND DEXAMETHASONE 3; 1 MG/ML; MG/ML
4 SUSPENSION/ DROPS AURICULAR (OTIC) 2 TIMES DAILY
Qty: 7.5 ML | Refills: 0 | Status: SHIPPED | OUTPATIENT
Start: 2024-06-13 | End: 2024-06-20

## 2024-06-13 NOTE — PROGRESS NOTES
Vannessa Soto is a 54 y.o. year old female patient with RIGHT EAR INFECTION     Patient presents to the office today for assessment of right ear.  Patient with recent right ear pain and drainage.  She was seen in the urgent care and treated with drops as well as oral antibiotics on 2 occasions for otitis externa.  Still reporting some discomfort and ear fullness on the right.  All other ENT issues are negative.      Review of Systems   All other systems reviewed and are negative.        Physical Exam:   General appearance: No acute distress. Normal facies. Symmetric facial movement. No gross lesions of the face are noted.  The external ear structures appear normal.  Examination of the left ear is normal. The middle ear, tympanic membrane, external auditory canal, and external ear itself are grossly unremarkable without evidence of active disease.  Right ear noted for evidence of acute otitis externa with purulent otorrhea.  The ear was debrided today.  Tympanic membrane intact.  Anterior rhinoscopy notes essentially a midline nasal septum. Examination is noted for normal healthy mucosal membranes without any evidence of lesions, polyps, or exudate. The tongue is normally mobile. There are no lesions on the gingiva, buccal, or oral mucosa. There are no oral cavity masses.  The neck is negative for mass lymphadenopathy. The trachea and parotid are clear. The thyroid bed is grossly unremarkable. The salivary gland structures are grossly unremarkable.    Assessment/Plan   1.  Otitis externa    Patient seen in the office today for assessment of ears.  Patient with acute otitis externa right ear.  The ear was debrided today under the otomicroscope.  Patient to be started on Ciprodex drops and see us back in 3 weeks.

## 2024-07-11 ENCOUNTER — APPOINTMENT (OUTPATIENT)
Dept: OTOLARYNGOLOGY | Facility: CLINIC | Age: 54
End: 2024-07-11
Payer: COMMERCIAL

## 2024-08-21 ENCOUNTER — APPOINTMENT (OUTPATIENT)
Dept: CARDIOLOGY | Facility: HOSPITAL | Age: 54
End: 2024-08-21
Payer: COMMERCIAL

## 2024-08-21 ENCOUNTER — HOSPITAL ENCOUNTER (EMERGENCY)
Facility: HOSPITAL | Age: 54
Discharge: HOME | End: 2024-08-22
Attending: EMERGENCY MEDICINE
Payer: COMMERCIAL

## 2024-08-21 ENCOUNTER — APPOINTMENT (OUTPATIENT)
Dept: RADIOLOGY | Facility: HOSPITAL | Age: 54
End: 2024-08-21
Payer: COMMERCIAL

## 2024-08-21 VITALS
OXYGEN SATURATION: 94 % | SYSTOLIC BLOOD PRESSURE: 106 MMHG | WEIGHT: 210 LBS | BODY MASS INDEX: 38.64 KG/M2 | HEIGHT: 62 IN | HEART RATE: 60 BPM | TEMPERATURE: 96.8 F | DIASTOLIC BLOOD PRESSURE: 56 MMHG | RESPIRATION RATE: 16 BRPM

## 2024-08-21 DIAGNOSIS — R55 SYNCOPE, UNSPECIFIED SYNCOPE TYPE: Primary | ICD-10-CM

## 2024-08-21 DIAGNOSIS — R10.9 ABDOMINAL PAIN, UNSPECIFIED ABDOMINAL LOCATION: ICD-10-CM

## 2024-08-21 LAB
ALBUMIN SERPL BCP-MCNC: 3.3 G/DL (ref 3.4–5)
ALP SERPL-CCNC: 77 U/L (ref 33–110)
ALT SERPL W P-5'-P-CCNC: 16 U/L (ref 7–45)
ANION GAP SERPL CALC-SCNC: 10 MMOL/L (ref 10–20)
APPEARANCE UR: CLEAR
AST SERPL W P-5'-P-CCNC: 14 U/L (ref 9–39)
BASOPHILS # BLD AUTO: 0.11 X10*3/UL (ref 0–0.1)
BASOPHILS NFR BLD AUTO: 0.9 %
BILIRUB SERPL-MCNC: 0.5 MG/DL (ref 0–1.2)
BILIRUB UR STRIP.AUTO-MCNC: NEGATIVE MG/DL
BUN SERPL-MCNC: 17 MG/DL (ref 6–23)
CALCIUM SERPL-MCNC: 8.9 MG/DL (ref 8.6–10.3)
CARDIAC TROPONIN I PNL SERPL HS: 4 NG/L (ref 0–13)
CARDIAC TROPONIN I PNL SERPL HS: 4 NG/L (ref 0–13)
CHLORIDE SERPL-SCNC: 106 MMOL/L (ref 98–107)
CO2 SERPL-SCNC: 26 MMOL/L (ref 21–32)
COLOR UR: ABNORMAL
CREAT SERPL-MCNC: 0.83 MG/DL (ref 0.5–1.05)
EGFRCR SERPLBLD CKD-EPI 2021: 83 ML/MIN/1.73M*2
EOSINOPHIL # BLD AUTO: 0.14 X10*3/UL (ref 0–0.7)
EOSINOPHIL NFR BLD AUTO: 1.2 %
ERYTHROCYTE [DISTWIDTH] IN BLOOD BY AUTOMATED COUNT: 13 % (ref 11.5–14.5)
GLUCOSE SERPL-MCNC: 274 MG/DL (ref 74–99)
GLUCOSE UR STRIP.AUTO-MCNC: ABNORMAL MG/DL
HCT VFR BLD AUTO: 53.3 % (ref 36–46)
HGB BLD-MCNC: 17.7 G/DL (ref 12–16)
HOLD SPECIMEN: NORMAL
IMM GRANULOCYTES # BLD AUTO: 0.09 X10*3/UL (ref 0–0.7)
IMM GRANULOCYTES NFR BLD AUTO: 0.7 % (ref 0–0.9)
KETONES UR STRIP.AUTO-MCNC: NEGATIVE MG/DL
LEUKOCYTE ESTERASE UR QL STRIP.AUTO: NEGATIVE
LYMPHOCYTES # BLD AUTO: 2.47 X10*3/UL (ref 1.2–4.8)
LYMPHOCYTES NFR BLD AUTO: 20.4 %
MAGNESIUM SERPL-MCNC: 1.79 MG/DL (ref 1.6–2.4)
MCH RBC QN AUTO: 29.9 PG (ref 26–34)
MCHC RBC AUTO-ENTMCNC: 33.2 G/DL (ref 32–36)
MCV RBC AUTO: 90 FL (ref 80–100)
MONOCYTES # BLD AUTO: 0.67 X10*3/UL (ref 0.1–1)
MONOCYTES NFR BLD AUTO: 5.5 %
NEUTROPHILS # BLD AUTO: 8.61 X10*3/UL (ref 1.2–7.7)
NEUTROPHILS NFR BLD AUTO: 71.3 %
NITRITE UR QL STRIP.AUTO: NEGATIVE
NRBC BLD-RTO: 0 /100 WBCS (ref 0–0)
PH UR STRIP.AUTO: 5.5 [PH]
PLATELET # BLD AUTO: 210 X10*3/UL (ref 150–450)
POTASSIUM SERPL-SCNC: 4.3 MMOL/L (ref 3.5–5.3)
PROT SERPL-MCNC: 5.4 G/DL (ref 6.4–8.2)
PROT UR STRIP.AUTO-MCNC: NEGATIVE MG/DL
RBC # BLD AUTO: 5.92 X10*6/UL (ref 4–5.2)
RBC # UR STRIP.AUTO: NEGATIVE /UL
SODIUM SERPL-SCNC: 138 MMOL/L (ref 136–145)
SP GR UR STRIP.AUTO: >1.05
UROBILINOGEN UR STRIP.AUTO-MCNC: NORMAL MG/DL
WBC # BLD AUTO: 12.1 X10*3/UL (ref 4.4–11.3)

## 2024-08-21 PROCEDURE — 74177 CT ABD & PELVIS W/CONTRAST: CPT

## 2024-08-21 PROCEDURE — 83735 ASSAY OF MAGNESIUM: CPT | Performed by: EMERGENCY MEDICINE

## 2024-08-21 PROCEDURE — 96375 TX/PRO/DX INJ NEW DRUG ADDON: CPT

## 2024-08-21 PROCEDURE — 93005 ELECTROCARDIOGRAM TRACING: CPT

## 2024-08-21 PROCEDURE — 36415 COLL VENOUS BLD VENIPUNCTURE: CPT | Performed by: EMERGENCY MEDICINE

## 2024-08-21 PROCEDURE — 81003 URINALYSIS AUTO W/O SCOPE: CPT | Performed by: EMERGENCY MEDICINE

## 2024-08-21 PROCEDURE — 85025 COMPLETE CBC W/AUTO DIFF WBC: CPT | Performed by: EMERGENCY MEDICINE

## 2024-08-21 PROCEDURE — 80053 COMPREHEN METABOLIC PANEL: CPT | Performed by: EMERGENCY MEDICINE

## 2024-08-21 PROCEDURE — 96374 THER/PROPH/DIAG INJ IV PUSH: CPT

## 2024-08-21 PROCEDURE — 74177 CT ABD & PELVIS W/CONTRAST: CPT | Performed by: STUDENT IN AN ORGANIZED HEALTH CARE EDUCATION/TRAINING PROGRAM

## 2024-08-21 PROCEDURE — 2550000001 HC RX 255 CONTRASTS: Performed by: EMERGENCY MEDICINE

## 2024-08-21 PROCEDURE — 96376 TX/PRO/DX INJ SAME DRUG ADON: CPT

## 2024-08-21 PROCEDURE — 96361 HYDRATE IV INFUSION ADD-ON: CPT

## 2024-08-21 PROCEDURE — 2500000004 HC RX 250 GENERAL PHARMACY W/ HCPCS (ALT 636 FOR OP/ED): Performed by: EMERGENCY MEDICINE

## 2024-08-21 PROCEDURE — 84484 ASSAY OF TROPONIN QUANT: CPT | Performed by: EMERGENCY MEDICINE

## 2024-08-21 PROCEDURE — 2500000001 HC RX 250 WO HCPCS SELF ADMINISTERED DRUGS (ALT 637 FOR MEDICARE OP): Performed by: EMERGENCY MEDICINE

## 2024-08-21 PROCEDURE — 99285 EMERGENCY DEPT VISIT HI MDM: CPT | Mod: 25

## 2024-08-21 RX ORDER — ONDANSETRON HYDROCHLORIDE 2 MG/ML
4 INJECTION, SOLUTION INTRAVENOUS ONCE
Status: COMPLETED | OUTPATIENT
Start: 2024-08-21 | End: 2024-08-21

## 2024-08-21 RX ORDER — MECLIZINE HCL 12.5 MG 12.5 MG/1
25 TABLET ORAL ONCE
Status: COMPLETED | OUTPATIENT
Start: 2024-08-21 | End: 2024-08-21

## 2024-08-21 RX ORDER — MORPHINE SULFATE 4 MG/ML
4 INJECTION, SOLUTION INTRAMUSCULAR; INTRAVENOUS ONCE
Status: COMPLETED | OUTPATIENT
Start: 2024-08-21 | End: 2024-08-21

## 2024-08-21 RX ADMIN — MECLIZINE HCL 12.5 MG 25 MG: 12.5 TABLET ORAL at 18:50

## 2024-08-21 RX ADMIN — MORPHINE SULFATE 4 MG: 4 INJECTION, SOLUTION INTRAMUSCULAR; INTRAVENOUS at 21:28

## 2024-08-21 RX ADMIN — IOHEXOL 75 ML: 350 INJECTION, SOLUTION INTRAVENOUS at 21:23

## 2024-08-21 RX ADMIN — ONDANSETRON 4 MG: 2 INJECTION INTRAMUSCULAR; INTRAVENOUS at 19:49

## 2024-08-21 RX ADMIN — ONDANSETRON 4 MG: 2 INJECTION INTRAMUSCULAR; INTRAVENOUS at 21:28

## 2024-08-21 RX ADMIN — SODIUM CHLORIDE, POTASSIUM CHLORIDE, SODIUM LACTATE AND CALCIUM CHLORIDE 1000 ML: 600; 310; 30; 20 INJECTION, SOLUTION INTRAVENOUS at 18:50

## 2024-08-21 ASSESSMENT — PAIN DESCRIPTION - LOCATION
LOCATION: ABDOMEN
LOCATION: ABDOMEN

## 2024-08-21 ASSESSMENT — PAIN DESCRIPTION - PROGRESSION: CLINICAL_PROGRESSION: GRADUALLY IMPROVING

## 2024-08-21 ASSESSMENT — PAIN SCALES - GENERAL
PAINLEVEL_OUTOF10: 5 - MODERATE PAIN
PAINLEVEL_OUTOF10: 2
PAINLEVEL_OUTOF10: 7

## 2024-08-21 ASSESSMENT — LIFESTYLE VARIABLES
HAVE PEOPLE ANNOYED YOU BY CRITICIZING YOUR DRINKING: NO
TOTAL SCORE: 0
EVER HAD A DRINK FIRST THING IN THE MORNING TO STEADY YOUR NERVES TO GET RID OF A HANGOVER: NO
HAVE YOU EVER FELT YOU SHOULD CUT DOWN ON YOUR DRINKING: NO
EVER FELT BAD OR GUILTY ABOUT YOUR DRINKING: NO

## 2024-08-21 ASSESSMENT — PAIN DESCRIPTION - PAIN TYPE
TYPE: ACUTE PAIN
TYPE: ACUTE PAIN

## 2024-08-21 ASSESSMENT — PAIN - FUNCTIONAL ASSESSMENT
PAIN_FUNCTIONAL_ASSESSMENT: 0-10
PAIN_FUNCTIONAL_ASSESSMENT: 0-10

## 2024-08-21 NOTE — ED PROVIDER NOTES
HPI   Chief Complaint   Patient presents with    Syncope       Patient is a 54-year-old female presents today with a near syncopal event and dizziness.  She states that earlier today she donated plasma.  This is the third time she has done this.  She felt fine during the procedure.  She drank some juice and ate some crackers.  When she got home she ate a meal.  She went over to a family member's house and while sitting outside started to feel nauseated and dizzy.  She tried to walk back to the truck and then felt very hot flushed and room spinning sensation.  She laid down on the ground and felt that she was going to pass out.  She got very panicky.  She does not think she fully passed out.  She feels worse when she sits up.  She denies any headache.  She denies any chest pain or shortness of breath.  She denies abdominal pain but does have nausea.  She denies any numbness, tingling, weakness of the extremities.  She has never had this happen before.  She has a history of COPD and type 2 diabetes.  She is on Trulicity.  She has a history of  recent otitis externa in .              Patient History   Past Medical History:   Diagnosis Date    Anxiety     Arthritis     COPD (chronic obstructive pulmonary disease) (Multi)     Diabetes mellitus (Multi)     GERD (gastroesophageal reflux disease)     Hemodialysis status (CMS-[facility])     Hyperlipidemia     Hypertension     Irritable bowel syndrome     Neuromuscular disorder (Multi)     Substance addiction (Multi)     12/15/23 states sober for 6 years    Wears glasses      Past Surgical History:   Procedure Laterality Date     SECTION, LOW TRANSVERSE      CHOLECYSTECTOMY      HYSTERECTOMY      TONSILLECTOMY       Family History   Problem Relation Name Age of Onset    Lung cancer Mother      Coronary artery disease Mother      Diabetes type II Mother      Coronary artery disease Father      Diabetes type II Father      Other (sepsis) Sister      Other (chronic kidney  "disease) Other grandfather     Colon cancer Other grandfather     Seizures Sibling      Parkinsonism Sibling       Social History     Tobacco Use    Smoking status: Former     Current packs/day: 0.00     Average packs/day: 0.5 packs/day for 35.0 years (17.5 ttl pk-yrs)     Types: Cigarettes     Start date: 10/1988     Quit date: 10/2023     Years since quittin.8    Smokeless tobacco: Never   Vaping Use    Vaping status: Some Days    Substances: Nicotine    Devices: H5 tank   Substance Use Topics    Alcohol use: Not Currently    Drug use: Not Currently     Comment: h/o substance abuse -years ago \"crack\"       Physical Exam   ED Triage Vitals [24 1827]   Temperature Heart Rate Respirations BP   36 °C (96.8 °F) 62 20 108/54      Pulse Ox Temp Source Heart Rate Source Patient Position   97 % Temporal Monitor --      BP Location FiO2 (%)     -- --       Physical Exam  Vitals and nursing note reviewed.   Constitutional:       General: She is not in acute distress.     Appearance: She is well-developed. She is obese. She is diaphoretic. She is not ill-appearing.   HENT:      Head: Normocephalic and atraumatic.      Right Ear: Tympanic membrane and ear canal normal.      Left Ear: Tympanic membrane and ear canal normal.      Nose: Nose normal.      Mouth/Throat:      Mouth: Mucous membranes are dry.      Pharynx: Oropharynx is clear.   Eyes:      Extraocular Movements: Extraocular movements intact.      Conjunctiva/sclera: Conjunctivae normal.      Pupils: Pupils are equal, round, and reactive to light.      Comments: Slight right lateral horizontal nystagmus   Neck:      Vascular: No carotid bruit.   Cardiovascular:      Rate and Rhythm: Normal rate and regular rhythm.      Pulses: Normal pulses.      Heart sounds: Normal heart sounds. No murmur heard.  Pulmonary:      Effort: Pulmonary effort is normal. No respiratory distress.      Breath sounds: Normal breath sounds.   Abdominal:      General: Abdomen " is flat. Bowel sounds are normal.      Palpations: Abdomen is soft.      Tenderness: There is no abdominal tenderness.   Musculoskeletal:         General: No swelling. Normal range of motion.      Cervical back: Normal range of motion and neck supple.   Skin:     General: Skin is warm.      Capillary Refill: Capillary refill takes 2 to 3 seconds.      Coloration: Skin is not pale.   Neurological:      Mental Status: She is alert and oriented to person, place, and time.      Cranial Nerves: No cranial nerve deficit.      Sensory: No sensory deficit.      Motor: No weakness.      Coordination: Coordination normal.           ED Course & MDM   ED Course as of 08/23/24 1042   Wed Aug 21, 2024   1937 Feeling less dizzy but nauseated [ES]   2010 Patient feeling very chilled and cold.  Rechecked heart rate is 60.  Blood pressure 118/59.  Patient is complaining of abdominal pain developing.  Feels pain along the left lateral abdomen.  Abdomen is soft with good bowel tones.  No bruits.  Will consider a CT scan for assessment of developing symptoms [ES]      ED Course User Index  [ES] Latia Santamaria MD         Diagnoses as of 08/23/24 1042   Syncope, unspecified syncope type   Abdominal pain, unspecified abdominal location                 No data recorded     [place] Coma Scale Score: 15 (08/21/24 1827 : Cosme Reyez RN)                           Medical Decision Making  Medications  lactated Ringer's bolus 1,000 mL (1,000 mL intravenous New Bag 8/21/24 1850)  ondansetron (Zofran) injection 4 mg (has no administration in time range)  meclizine (Antivert) tablet 25 mg (25 mg oral Given 8/21/24 1850)    Assessment: Patient presents today after a near syncopal event.  There is a combination of nausea, dizziness with the room spinning sensation and now evolution of some abdominal pain.  She does not have any signs of ACS.  Her labs are reassuring.  With the new onset abdominal pain a CT scan is ordered and pending.  Her  vital signs of remained stable.  Her abdomen is soft and nontender.  I feel low likelihood for intra-abdominal infection, dissection or kidney stone.  She has been cold and shaky but has not spiked a fever.  The urinary tract infection is still in the differential.  At this point I do not have a clear-cut picture the source of the symptoms.  I would recommend continued diagnostic evaluation and observation.  I spoke with her and her family and they were amenable with the plan.    Care is transferred to Dr. Mariee for further workup and final disposition.    Amount and/or Complexity of Data Reviewed  Labs:  Decision-making details documented in ED Course.  Radiology:  Decision-making details documented in ED Course.  ECG/medicine tests: independent interpretation performed.     Details: Sinus rhythm rate of 72 bpm with p wave inversion.  PVC.  Right bundle branch block consistent with prior EKG.  No acute ST or T wave segment change indicate acute coronary syndrome.        Procedure  Procedures     Latia Santamaria MD  08/21/24 2045       Latia Santamaria MD  08/23/24 1048

## 2024-08-22 LAB — HOLD SPECIMEN: NORMAL

## 2024-08-22 NOTE — PROGRESS NOTES
Emergency Medicine Transition of Care Note.    I received Vannessa Soto in signout from Dr. Santamaria.  Please see the previous ED provider note for all HPI, PE and MDM up to the time of signout at 2100. This is in addition to the primary record.    In brief Vannessa Soto is an 54 y.o. female presenting for   Chief Complaint   Patient presents with    Syncope     At the time of signout we were awaiting: Urinalysis/CT abd/pelvis    ED Course as of 08/22/24 0201   Wed Aug 21, 2024   1937 Feeling less dizzy but nauseated [ES]   2010 Patient feeling very chilled and cold.  Rechecked heart rate is 60.  Blood pressure 118/59.  Patient is complaining of abdominal pain developing.  Feels pain along the left lateral abdomen.  Abdomen is soft with good bowel tones.  No bruits.  Will consider a CT scan for assessment of developing symptoms [ES]      ED Course User Index  [ES] Latia Santamaria MD         Diagnoses as of 08/22/24 0201   Syncope, unspecified syncope type   Abdominal pain, unspecified abdominal location       Medical Decision Making  Patient is a 54-year-old female presenting to the emergency department for near syncopal event.  Patient was evaluated by the previous provider and signed out to me pending CT imaging and labs and reevaluation.    CBC with white blood cells of 12.1 which are same from a year ago.  Patient's hemoglobin was slightly elevated not drastically unchanged from a year ago.  No thrombocytopenia.  Metabolic panel with some hyperglycemia but otherwise unremarkable.  No anion gap.  No evidence of DKA.  Urinalysis not indicative of infection.  Troponin negative x 2.  Magnesium levels normal.  CT abdomen and pelvis without any acute intra-abdominal pathology.  Patient was ambulated in the emergency department without reproduction of her symptoms.  Patient states that she feels nauseous but otherwise feels well and would like to go home and follow-up with her doctors.  She was advised of her  laboratory and imaging findings.  Patient was given return precautions.  Patient verbalized understanding of all instructions given to her.  Patient was appreciative care and agreeable to discharge.        Final diagnoses:   [R55] Syncope, unspecified syncope type   [R10.9] Abdominal pain, unspecified abdominal location     Labs Reviewed   CBC WITH AUTO DIFFERENTIAL - Abnormal       Result Value    WBC 12.1 (*)     nRBC 0.0      RBC 5.92 (*)     Hemoglobin 17.7 (*)     Hematocrit 53.3 (*)     MCV 90      MCH 29.9      MCHC 33.2      RDW 13.0      Platelets 210      Neutrophils % 71.3      Immature Granulocytes %, Automated 0.7      Lymphocytes % 20.4      Monocytes % 5.5      Eosinophils % 1.2      Basophils % 0.9      Neutrophils Absolute 8.61 (*)     Immature Granulocytes Absolute, Automated 0.09      Lymphocytes Absolute 2.47      Monocytes Absolute 0.67      Eosinophils Absolute 0.14      Basophils Absolute 0.11 (*)    COMPREHENSIVE METABOLIC PANEL - Abnormal    Glucose 274 (*)     Sodium 138      Potassium 4.3      Chloride 106      Bicarbonate 26      Anion Gap 10      Urea Nitrogen 17      Creatinine 0.83      eGFR 83      Calcium 8.9      Albumin 3.3 (*)     Alkaline Phosphatase 77      Total Protein 5.4 (*)     AST 14      Bilirubin, Total 0.5      ALT 16     URINALYSIS WITH REFLEX CULTURE AND MICROSCOPIC - Abnormal    Color, Urine Light-Yellow      Appearance, Urine Clear      Specific Gravity, Urine >1.050 (*)     pH, Urine 5.5      Protein, Urine NEGATIVE      Glucose, Urine OVER (4+) (*)     Blood, Urine NEGATIVE      Ketones, Urine NEGATIVE      Bilirubin, Urine NEGATIVE      Urobilinogen, Urine Normal      Nitrite, Urine NEGATIVE      Leukocyte Esterase, Urine NEGATIVE      Narrative:     OVER is reported when the result is greater than the clinically reportable range.   MAGNESIUM - Normal    Magnesium 1.79     SERIAL TROPONIN-INITIAL - Normal    Troponin I, High Sensitivity 4      Narrative:     Less  than 99th percentile of normal range cutoff-  Female and children under 18 years old <14 ng/L; Male <21 ng/L: Negative  Repeat testing should be performed if clinically indicated.     Female and children under 18 years old 14-50 ng/L; Male 21-50 ng/L:  Consistent with possible cardiac damage and possible increased clinical   risk. Serial measurements may help to assess extent of myocardial damage.     >50 ng/L: Consistent with cardiac damage, increased clinical risk and  myocardial infarction. Serial measurements may help assess extent of   myocardial damage.      NOTE: Children less than 1 year old may have higher baseline troponin   levels and results should be interpreted in conjunction with the overall   clinical context.     NOTE: Troponin I testing is performed using a different   testing methodology at [facility] than at other   [facility]. Direct result comparisons should only   be made within the same method.   SERIAL TROPONIN, 1 HOUR - Normal    Troponin I, High Sensitivity 4      Narrative:     Less than 99th percentile of normal range cutoff-  Female and children under 18 years old <14 ng/L; Male <21 ng/L: Negative  Repeat testing should be performed if clinically indicated.     Female and children under 18 years old 14-50 ng/L; Male 21-50 ng/L:  Consistent with possible cardiac damage and possible increased clinical   risk. Serial measurements may help to assess extent of myocardial damage.     >50 ng/L: Consistent with cardiac damage, increased clinical risk and  myocardial infarction. Serial measurements may help assess extent of   myocardial damage.      NOTE: Children less than 1 year old may have higher baseline troponin   levels and results should be interpreted in conjunction with the overall   clinical context.     NOTE: Troponin I testing is performed using a different   testing methodology at [facility] than at other   [facility]. Direct result  comparisons should only   be made within the same method.   TROPONIN SERIES- (INITIAL, 1 HR)    Narrative:     The following orders were created for panel order Troponin I Series, High Sensitivity (0, 1 HR).  Procedure                               Abnormality         Status                     ---------                               -----------         ------                     Troponin I, High Sensiti...[[id number]]  Normal              Final result               Troponin, High Sensitivi...[[id number]]  Normal              Final result                 Please view results for these tests on the individual orders.   URINALYSIS WITH REFLEX CULTURE AND MICROSCOPIC    Narrative:     The following orders were created for panel order Urinalysis with Reflex Culture and Microscopic.  Procedure                               Abnormality         Status                     ---------                               -----------         ------                     Urinalysis with Reflex C...[[id number]]  Abnormal            Final result               Extra Urine Gray Tube[[id number]]                            In process                   Please view results for these tests on the individual orders.   EXTRA URINE GRAY TUBE     CT abdomen pelvis w IV contrast   Final Result   1.  No acute abnormality in the abdomen/pelvis. Hepatomegaly and   hepatic steatosis. Atherosclerotic change of the abdominal aorta.             Signed by: Andrei Abdalla 8/21/2024 9:56 PM   Dictation workstation:   [id number]              Procedure  Procedures    Cale Mariee DO

## 2024-08-22 NOTE — DISCHARGE INSTRUCTIONS
Please follow up with your Primary Care Provider (PCP) within the next 2-3 days regarding your ED visit.  Seek immediate medical attention if you develop:  Chest pain, difficulty breathing, worsening abdominal pain, new or worsening nausea, new or worsening vomiting, new or worsening diarrhea, chest pain, shortness of breath, pain with urination, problems urinating, fever, chills, weakness, or any new or worsening symptoms.  These documents have a lot of useful information! Please read them, so you know what to expect, what you can do for yourself at home, and also to know concerning signs warrant a return to the Emergency Department for additional evaluation.  You are welcome back any time. Thank you for entrusting your care to us, I hope we made your visit as pleasant as possible. Wishing you well!    Dr. Mariee

## 2024-08-23 LAB
ATRIAL RATE: 72 BPM
P AXIS: -40 DEGREES
P OFFSET: 194 MS
P ONSET: 145 MS
PR INTERVAL: 148 MS
Q ONSET: 219 MS
QRS COUNT: 12 BEATS
QRS DURATION: 132 MS
QT INTERVAL: 446 MS
QTC CALCULATION(BAZETT): 488 MS
QTC FREDERICIA: 474 MS
R AXIS: 27 DEGREES
T AXIS: 37 DEGREES
T OFFSET: 442 MS
VENTRICULAR RATE: 72 BPM

## 2024-08-29 ENCOUNTER — TELEPHONE (OUTPATIENT)
Dept: GASTROENTEROLOGY | Facility: CLINIC | Age: 54
End: 2024-08-29
Payer: COMMERCIAL

## 2024-08-29 NOTE — TELEPHONE ENCOUNTER
I tried calling to schedule an appointment; referral from [facility] Weeks for Fatty Liver.  Voicemail was full and not able to leave a message.

## 2024-09-24 ENCOUNTER — HOSPITAL ENCOUNTER (EMERGENCY)
Facility: HOSPITAL | Age: 54
Discharge: HOME | End: 2024-09-24
Payer: COMMERCIAL

## 2024-09-24 ENCOUNTER — APPOINTMENT (OUTPATIENT)
Dept: RADIOLOGY | Facility: HOSPITAL | Age: 54
End: 2024-09-24
Payer: COMMERCIAL

## 2024-09-24 VITALS
SYSTOLIC BLOOD PRESSURE: 172 MMHG | TEMPERATURE: 97.2 F | DIASTOLIC BLOOD PRESSURE: 89 MMHG | RESPIRATION RATE: 20 BRPM | BODY MASS INDEX: 37.17 KG/M2 | WEIGHT: 202 LBS | HEART RATE: 67 BPM | HEIGHT: 62 IN | OXYGEN SATURATION: 98 %

## 2024-09-24 DIAGNOSIS — S90.121A CONTUSION OF LESSER TOE OF RIGHT FOOT WITHOUT DAMAGE TO NAIL, INITIAL ENCOUNTER: Primary | ICD-10-CM

## 2024-09-24 PROCEDURE — 99283 EMERGENCY DEPT VISIT LOW MDM: CPT

## 2024-09-24 PROCEDURE — 2500000001 HC RX 250 WO HCPCS SELF ADMINISTERED DRUGS (ALT 637 FOR MEDICARE OP): Performed by: NURSE PRACTITIONER

## 2024-09-24 PROCEDURE — 73630 X-RAY EXAM OF FOOT: CPT | Mod: RT

## 2024-09-24 RX ORDER — IBUPROFEN 400 MG/1
400 TABLET ORAL ONCE
Status: COMPLETED | OUTPATIENT
Start: 2024-09-24 | End: 2024-09-24

## 2024-09-24 RX ORDER — IBUPROFEN 600 MG/1
600 TABLET ORAL EVERY 6 HOURS PRN
Qty: 24 TABLET | Refills: 0 | Status: SHIPPED | OUTPATIENT
Start: 2024-09-24

## 2024-09-24 ASSESSMENT — PAIN DESCRIPTION - PAIN TYPE: TYPE: ACUTE PAIN

## 2024-09-24 ASSESSMENT — PAIN SCALES - GENERAL: PAINLEVEL_OUTOF10: 5 - MODERATE PAIN

## 2024-09-24 ASSESSMENT — PAIN - FUNCTIONAL ASSESSMENT: PAIN_FUNCTIONAL_ASSESSMENT: 0-10

## 2024-09-24 ASSESSMENT — PAIN DESCRIPTION - ORIENTATION: ORIENTATION: RIGHT

## 2024-09-24 ASSESSMENT — PAIN DESCRIPTION - LOCATION: LOCATION: TOE (COMMENT WHICH ONE)

## 2024-09-24 NOTE — ED PROVIDER NOTES
HPI   Chief Complaint   Patient presents with    Toe Pain     Pt states [place] [employer], no LOC, No blood thinners and did not hit her head. Pt having right 2nd digit toe pain. Toe is bruised and swollen. Pt states she felt it bend back when she fell.        54-year-old female presents emergency department, states about 2:30 AM she went to [employer], states it was wet on the floor, she slipped she was going in, striking her toe, second toe of her right foot.  States she seemed okay, went home but the toe began to hurt, became bruised.  States she is elevated and taken Tylenol but it continues to be painful, especially when she tries to walk on it at all.    Denies any additional injuries or complaints.              Patient History   Past Medical History:   Diagnosis Date    Anxiety     Arthritis     COPD (chronic obstructive pulmonary disease) (Multi)     Diabetes mellitus (Multi)     GERD (gastroesophageal reflux disease)     Hemodialysis status (CMS-HCC)     Hyperlipidemia     Hypertension     Irritable bowel syndrome     Neuromuscular disorder (Multi)     Substance addiction (Multi)     12/15/23 states sober for 6 years    Wears glasses      Past Surgical History:   Procedure Laterality Date     SECTION, LOW TRANSVERSE      CHOLECYSTECTOMY      HYSTERECTOMY      TONSILLECTOMY       Family History   Problem Relation Name Age of Onset    Lung cancer Mother      Coronary artery disease Mother      Diabetes type II Mother      Coronary artery disease Father      Diabetes type II Father      Other (sepsis) Sister      Other (chronic kidney disease) Other grandfather     Colon cancer Other grandfather     Seizures Sibling      Parkinsonism Sibling       Social History     Tobacco Use    Smoking status: Former     Current packs/day: 0.00     Average packs/day: 0.5 packs/day for 35.0 years (17.5 ttl pk-yrs)     Types: Cigarettes     Start date: 10/1988     Quit date: 10/2023     Years since  "quittin.9    Smokeless tobacco: Never   Vaping Use    Vaping status: Some Days    Substances: Nicotine    Devices: Refillable tank   Substance Use Topics    Alcohol use: Not Currently    Drug use: Not Currently     Comment: h/o substance abuse -years ago \"crack\"       Physical Exam   ED Triage Vitals [24 1527]   Temperature Heart Rate Respirations BP   36.2 °C (97.2 °F) 66 18 (!) 176/107      Pulse Ox Temp Source Heart Rate Source Patient Position   96 % Temporal Monitor --      BP Location FiO2 (%)     -- --       Physical Exam  General: Vitals noted. No distress. Afebrile.  Cardiac: Regular rate and rhythm. No murmur.  Pulmonary: Equal breath sounds bilaterally. No adventitious breath sounds.  Abdomen: Soft, nontender, nonsurgical. Normoactive bowel sounds  Back: Nontender throughout  Lower extremity:  Exam of the right foot shows ecchymosis of the second toe surrounding the toe and slightly onto the nail. The ankle is non-tender. The skin is intact. There is no deformity, edema. Is neurovascularly intact distally. The remainder of the lower extremity is non-tender.    ED Course & MDM   Diagnoses as of 24 165   Contusion of lesser toe of right foot without damage to nail, initial encounter        Labs Reviewed - No data to display     XR foot right 3+ views   Final Result   Normal radiographs of the right foot.        Signed by: Dewayne Owusu 2024 4:48 PM   Dictation workstation:   [id number]                 No data recorded     [place] Coma Scale Score: 15 (24 1528 : Karen Aparicio RN)                     Medical Decision Making  X-ray imaging of the second toe, entire foot, shows no evidence of acute fracture.    Placed in a postop shoe for comfort, did continue to ice and elevate.  Recommend follow-up with primary care, return with any worsening symptoms or additional concerns.    Procedure  Procedures     Ju Muro, APRN-CNP  24 1658    "

## 2024-10-09 ENCOUNTER — APPOINTMENT (OUTPATIENT)
Dept: CARDIOLOGY | Facility: HOSPITAL | Age: 54
End: 2024-10-09
Payer: COMMERCIAL

## 2024-10-09 ENCOUNTER — APPOINTMENT (OUTPATIENT)
Dept: RADIOLOGY | Facility: HOSPITAL | Age: 54
End: 2024-10-09
Payer: COMMERCIAL

## 2024-10-09 ENCOUNTER — HOSPITAL ENCOUNTER (INPATIENT)
Facility: HOSPITAL | Age: 54
LOS: 1 days | Discharge: HOME | End: 2024-10-11
Attending: EMERGENCY MEDICINE | Admitting: STUDENT IN AN ORGANIZED HEALTH CARE EDUCATION/TRAINING PROGRAM
Payer: COMMERCIAL

## 2024-10-09 DIAGNOSIS — E13.9 DIABETES 1.5, MANAGED AS TYPE 2 (MULTI): ICD-10-CM

## 2024-10-09 DIAGNOSIS — I10 PRIMARY HYPERTENSION: ICD-10-CM

## 2024-10-09 DIAGNOSIS — F17.210 CIGARETTE NICOTINE DEPENDENCE WITHOUT COMPLICATION: ICD-10-CM

## 2024-10-09 DIAGNOSIS — I25.9 CHEST PAIN DUE TO MYOCARDIAL ISCHEMIA, UNSPECIFIED ISCHEMIC CHEST PAIN TYPE: ICD-10-CM

## 2024-10-09 DIAGNOSIS — E66.01 OBESITY, CLASS III, BMI 40-49.9 (MORBID OBESITY) (MULTI): ICD-10-CM

## 2024-10-09 DIAGNOSIS — E78.5 HYPERLIPIDEMIA, UNSPECIFIED HYPERLIPIDEMIA TYPE: ICD-10-CM

## 2024-10-09 DIAGNOSIS — R07.9 CHEST PAIN, UNSPECIFIED: ICD-10-CM

## 2024-10-09 DIAGNOSIS — I20.89 ANGINA OF EFFORT (CMS-HCC): Primary | ICD-10-CM

## 2024-10-09 DIAGNOSIS — E11.00 TYPE 2 DIABETES MELLITUS WITH HYPEROSMOLARITY WITHOUT COMA, WITHOUT LONG-TERM CURRENT USE OF INSULIN (MULTI): Chronic | ICD-10-CM

## 2024-10-09 DIAGNOSIS — E11.9 TYPE 2 DIABETES MELLITUS WITHOUT COMPLICATION, WITHOUT LONG-TERM CURRENT USE OF INSULIN (MULTI): ICD-10-CM

## 2024-10-09 DIAGNOSIS — J45.909 ASTHMA, UNSPECIFIED ASTHMA SEVERITY, UNSPECIFIED WHETHER COMPLICATED, UNSPECIFIED WHETHER PERSISTENT (HHS-HCC): ICD-10-CM

## 2024-10-09 DIAGNOSIS — Z78.9 ALLERGY HISTORY UNKNOWN: ICD-10-CM

## 2024-10-09 LAB
ALBUMIN SERPL BCP-MCNC: 4.1 G/DL (ref 3.4–5)
ALP SERPL-CCNC: 88 U/L (ref 33–110)
ALT SERPL W P-5'-P-CCNC: 17 U/L (ref 7–45)
ANION GAP SERPL CALC-SCNC: 10 MMOL/L (ref 10–20)
AST SERPL W P-5'-P-CCNC: 13 U/L (ref 9–39)
ATRIAL RATE: 61 BPM
ATRIAL RATE: 66 BPM
BASOPHILS # BLD AUTO: 0.06 X10*3/UL (ref 0–0.1)
BASOPHILS NFR BLD AUTO: 0.9 %
BILIRUB SERPL-MCNC: 0.5 MG/DL (ref 0–1.2)
BNP SERPL-MCNC: 40 PG/ML (ref 0–99)
BUN SERPL-MCNC: 14 MG/DL (ref 6–23)
CALCIUM SERPL-MCNC: 9.9 MG/DL (ref 8.6–10.3)
CARDIAC TROPONIN I PNL SERPL HS: 3 NG/L (ref 0–13)
CARDIAC TROPONIN I PNL SERPL HS: 4 NG/L (ref 0–13)
CHLORIDE SERPL-SCNC: 107 MMOL/L (ref 98–107)
CHOLEST SERPL-MCNC: 193 MG/DL (ref 0–199)
CHOLESTEROL/HDL RATIO: 5.5
CO2 SERPL-SCNC: 26 MMOL/L (ref 21–32)
CREAT SERPL-MCNC: 0.73 MG/DL (ref 0.5–1.05)
EGFRCR SERPLBLD CKD-EPI 2021: >90 ML/MIN/1.73M*2
EOSINOPHIL # BLD AUTO: 0.09 X10*3/UL (ref 0–0.7)
EOSINOPHIL NFR BLD AUTO: 1.3 %
ERYTHROCYTE [DISTWIDTH] IN BLOOD BY AUTOMATED COUNT: 12.7 % (ref 11.5–14.5)
GLUCOSE BLD MANUAL STRIP-MCNC: 110 MG/DL (ref 74–99)
GLUCOSE BLD MANUAL STRIP-MCNC: 262 MG/DL (ref 74–99)
GLUCOSE SERPL-MCNC: 159 MG/DL (ref 74–99)
HCT VFR BLD AUTO: 47.5 % (ref 36–46)
HDLC SERPL-MCNC: 35 MG/DL
HGB BLD-MCNC: 16 G/DL (ref 12–16)
HOLD SPECIMEN: NORMAL
HOLD SPECIMEN: NORMAL
IMM GRANULOCYTES # BLD AUTO: 0.02 X10*3/UL (ref 0–0.7)
IMM GRANULOCYTES NFR BLD AUTO: 0.3 % (ref 0–0.9)
LDLC SERPL CALC-MCNC: 130 MG/DL
LYMPHOCYTES # BLD AUTO: 2.03 X10*3/UL (ref 1.2–4.8)
LYMPHOCYTES NFR BLD AUTO: 29.3 %
MCH RBC QN AUTO: 30.1 PG (ref 26–34)
MCHC RBC AUTO-ENTMCNC: 33.7 G/DL (ref 32–36)
MCV RBC AUTO: 89 FL (ref 80–100)
MONOCYTES # BLD AUTO: 0.42 X10*3/UL (ref 0.1–1)
MONOCYTES NFR BLD AUTO: 6.1 %
NEUTROPHILS # BLD AUTO: 4.3 X10*3/UL (ref 1.2–7.7)
NEUTROPHILS NFR BLD AUTO: 62.1 %
NON HDL CHOLESTEROL: 158 MG/DL (ref 0–149)
NRBC BLD-RTO: 0 /100 WBCS (ref 0–0)
P AXIS: -35 DEGREES
P AXIS: 25 DEGREES
P OFFSET: 193 MS
P OFFSET: 200 MS
P ONSET: 144 MS
P ONSET: 152 MS
PLATELET # BLD AUTO: 237 X10*3/UL (ref 150–450)
POTASSIUM SERPL-SCNC: 4.4 MMOL/L (ref 3.5–5.3)
PR INTERVAL: 142 MS
PR INTERVAL: 154 MS
PROT SERPL-MCNC: 6.3 G/DL (ref 6.4–8.2)
Q ONSET: 221 MS
Q ONSET: 223 MS
QRS COUNT: 10 BEATS
QRS COUNT: 11 BEATS
QRS DURATION: 128 MS
QRS DURATION: 138 MS
QT INTERVAL: 424 MS
QT INTERVAL: 428 MS
QTC CALCULATION(BAZETT): 426 MS
QTC CALCULATION(BAZETT): 448 MS
QTC FREDERICIA: 426 MS
QTC FREDERICIA: 442 MS
R AXIS: -10 DEGREES
R AXIS: 28 DEGREES
RBC # BLD AUTO: 5.32 X10*6/UL (ref 4–5.2)
SODIUM SERPL-SCNC: 139 MMOL/L (ref 136–145)
T AXIS: 16 DEGREES
T AXIS: 24 DEGREES
T OFFSET: 433 MS
T OFFSET: 437 MS
TRIGL SERPL-MCNC: 142 MG/DL (ref 0–149)
TSH SERPL-ACNC: 0.46 MIU/L (ref 0.44–3.98)
VENTRICULAR RATE: 61 BPM
VENTRICULAR RATE: 66 BPM
VLDL: 28 MG/DL (ref 0–40)
WBC # BLD AUTO: 6.9 X10*3/UL (ref 4.4–11.3)

## 2024-10-09 PROCEDURE — 2500000001 HC RX 250 WO HCPCS SELF ADMINISTERED DRUGS (ALT 637 FOR MEDICARE OP): Performed by: EMERGENCY MEDICINE

## 2024-10-09 PROCEDURE — 71045 X-RAY EXAM CHEST 1 VIEW: CPT

## 2024-10-09 PROCEDURE — 82947 ASSAY GLUCOSE BLOOD QUANT: CPT

## 2024-10-09 PROCEDURE — S4991 NICOTINE PATCH NONLEGEND: HCPCS | Performed by: STUDENT IN AN ORGANIZED HEALTH CARE EDUCATION/TRAINING PROGRAM

## 2024-10-09 PROCEDURE — 80061 LIPID PANEL: CPT | Performed by: STUDENT IN AN ORGANIZED HEALTH CARE EDUCATION/TRAINING PROGRAM

## 2024-10-09 PROCEDURE — 84484 ASSAY OF TROPONIN QUANT: CPT | Performed by: EMERGENCY MEDICINE

## 2024-10-09 PROCEDURE — G0378 HOSPITAL OBSERVATION PER HR: HCPCS

## 2024-10-09 PROCEDURE — 2500000004 HC RX 250 GENERAL PHARMACY W/ HCPCS (ALT 636 FOR OP/ED): Performed by: STUDENT IN AN ORGANIZED HEALTH CARE EDUCATION/TRAINING PROGRAM

## 2024-10-09 PROCEDURE — 2500000001 HC RX 250 WO HCPCS SELF ADMINISTERED DRUGS (ALT 637 FOR MEDICARE OP): Performed by: STUDENT IN AN ORGANIZED HEALTH CARE EDUCATION/TRAINING PROGRAM

## 2024-10-09 PROCEDURE — 84443 ASSAY THYROID STIM HORMONE: CPT | Performed by: STUDENT IN AN ORGANIZED HEALTH CARE EDUCATION/TRAINING PROGRAM

## 2024-10-09 PROCEDURE — 99285 EMERGENCY DEPT VISIT HI MDM: CPT

## 2024-10-09 PROCEDURE — 93005 ELECTROCARDIOGRAM TRACING: CPT

## 2024-10-09 PROCEDURE — 84075 ASSAY ALKALINE PHOSPHATASE: CPT | Performed by: EMERGENCY MEDICINE

## 2024-10-09 PROCEDURE — 83880 ASSAY OF NATRIURETIC PEPTIDE: CPT | Performed by: STUDENT IN AN ORGANIZED HEALTH CARE EDUCATION/TRAINING PROGRAM

## 2024-10-09 PROCEDURE — 99223 1ST HOSP IP/OBS HIGH 75: CPT | Performed by: STUDENT IN AN ORGANIZED HEALTH CARE EDUCATION/TRAINING PROGRAM

## 2024-10-09 PROCEDURE — 96372 THER/PROPH/DIAG INJ SC/IM: CPT | Performed by: STUDENT IN AN ORGANIZED HEALTH CARE EDUCATION/TRAINING PROGRAM

## 2024-10-09 PROCEDURE — 85025 COMPLETE CBC W/AUTO DIFF WBC: CPT | Performed by: EMERGENCY MEDICINE

## 2024-10-09 PROCEDURE — 2500000002 HC RX 250 W HCPCS SELF ADMINISTERED DRUGS (ALT 637 FOR MEDICARE OP, ALT 636 FOR OP/ED): Performed by: STUDENT IN AN ORGANIZED HEALTH CARE EDUCATION/TRAINING PROGRAM

## 2024-10-09 PROCEDURE — 36415 COLL VENOUS BLD VENIPUNCTURE: CPT | Performed by: EMERGENCY MEDICINE

## 2024-10-09 PROCEDURE — 71045 X-RAY EXAM CHEST 1 VIEW: CPT | Performed by: RADIOLOGY

## 2024-10-09 PROCEDURE — 83036 HEMOGLOBIN GLYCOSYLATED A1C: CPT | Mod: ELYLAB | Performed by: STUDENT IN AN ORGANIZED HEALTH CARE EDUCATION/TRAINING PROGRAM

## 2024-10-09 PROCEDURE — 2500000004 HC RX 250 GENERAL PHARMACY W/ HCPCS (ALT 636 FOR OP/ED)

## 2024-10-09 RX ORDER — NAPROXEN SODIUM 220 MG/1
324 TABLET, FILM COATED ORAL ONCE
Status: COMPLETED | OUTPATIENT
Start: 2024-10-09 | End: 2024-10-09

## 2024-10-09 RX ORDER — HYDROCHLOROTHIAZIDE 25 MG/1
12.5 TABLET ORAL DAILY
Status: DISCONTINUED | OUTPATIENT
Start: 2024-10-09 | End: 2024-10-11 | Stop reason: HOSPADM

## 2024-10-09 RX ORDER — INSULIN LISPRO 100 [IU]/ML
0-10 INJECTION, SOLUTION INTRAVENOUS; SUBCUTANEOUS
Status: DISCONTINUED | OUTPATIENT
Start: 2024-10-09 | End: 2024-10-11 | Stop reason: HOSPADM

## 2024-10-09 RX ORDER — MORPHINE SULFATE 4 MG/ML
4 INJECTION, SOLUTION INTRAMUSCULAR; INTRAVENOUS EVERY 4 HOURS PRN
Status: DISCONTINUED | OUTPATIENT
Start: 2024-10-09 | End: 2024-10-11 | Stop reason: HOSPADM

## 2024-10-09 RX ORDER — ASPIRIN 81 MG/1
81 TABLET ORAL DAILY
Status: DISCONTINUED | OUTPATIENT
Start: 2024-10-09 | End: 2024-10-11 | Stop reason: HOSPADM

## 2024-10-09 RX ORDER — ENOXAPARIN SODIUM 100 MG/ML
40 INJECTION SUBCUTANEOUS EVERY 24 HOURS
Status: DISCONTINUED | OUTPATIENT
Start: 2024-10-09 | End: 2024-10-11 | Stop reason: HOSPADM

## 2024-10-09 RX ORDER — MORPHINE SULFATE 2 MG/ML
INJECTION, SOLUTION INTRAMUSCULAR; INTRAVENOUS
Status: COMPLETED
Start: 2024-10-09 | End: 2024-10-09

## 2024-10-09 RX ORDER — ACETAMINOPHEN 325 MG/1
650 TABLET ORAL EVERY 4 HOURS PRN
Status: DISCONTINUED | OUTPATIENT
Start: 2024-10-09 | End: 2024-10-11 | Stop reason: HOSPADM

## 2024-10-09 RX ORDER — GLIPIZIDE 5 MG/1
5 TABLET ORAL 3 TIMES DAILY
Status: DISCONTINUED | OUTPATIENT
Start: 2024-10-09 | End: 2024-10-09

## 2024-10-09 RX ORDER — PANTOPRAZOLE SODIUM 40 MG/1
40 TABLET, DELAYED RELEASE ORAL 2 TIMES DAILY
Status: DISCONTINUED | OUTPATIENT
Start: 2024-10-09 | End: 2024-10-11 | Stop reason: HOSPADM

## 2024-10-09 RX ORDER — HYDROXYZINE PAMOATE 25 MG/1
25 CAPSULE ORAL 3 TIMES DAILY PRN
Status: DISCONTINUED | OUTPATIENT
Start: 2024-10-09 | End: 2024-10-11 | Stop reason: HOSPADM

## 2024-10-09 RX ORDER — MORPHINE SULFATE 2 MG/ML
1 INJECTION, SOLUTION INTRAMUSCULAR; INTRAVENOUS EVERY 4 HOURS PRN
Status: DISCONTINUED | OUTPATIENT
Start: 2024-10-09 | End: 2024-10-09

## 2024-10-09 RX ORDER — ATORVASTATIN CALCIUM 20 MG/1
40 TABLET, FILM COATED ORAL DAILY
Status: DISCONTINUED | OUTPATIENT
Start: 2024-10-09 | End: 2024-10-09

## 2024-10-09 RX ORDER — LIRAGLUTIDE 6 MG/ML
1.8 INJECTION SUBCUTANEOUS DAILY
COMMUNITY
Start: 2024-08-27 | End: 2024-10-16

## 2024-10-09 RX ORDER — MORPHINE SULFATE 2 MG/ML
2 INJECTION, SOLUTION INTRAMUSCULAR; INTRAVENOUS ONCE
Status: COMPLETED | OUTPATIENT
Start: 2024-10-09 | End: 2024-10-09

## 2024-10-09 RX ORDER — MONTELUKAST SODIUM 10 MG/1
10 TABLET ORAL NIGHTLY
Status: DISCONTINUED | OUTPATIENT
Start: 2024-10-09 | End: 2024-10-11 | Stop reason: HOSPADM

## 2024-10-09 RX ORDER — IBUPROFEN 200 MG
1 TABLET ORAL DAILY
Status: DISCONTINUED | OUTPATIENT
Start: 2024-10-09 | End: 2024-10-11 | Stop reason: HOSPADM

## 2024-10-09 RX ORDER — ATORVASTATIN CALCIUM 20 MG/1
40 TABLET, FILM COATED ORAL NIGHTLY
Status: DISCONTINUED | OUTPATIENT
Start: 2024-10-09 | End: 2024-10-11 | Stop reason: HOSPADM

## 2024-10-09 RX ORDER — ACETAMINOPHEN 160 MG/5ML
650 SOLUTION ORAL EVERY 4 HOURS PRN
Status: DISCONTINUED | OUTPATIENT
Start: 2024-10-09 | End: 2024-10-11 | Stop reason: HOSPADM

## 2024-10-09 RX ORDER — ALBUTEROL SULFATE 90 UG/1
2 INHALANT RESPIRATORY (INHALATION) EVERY 4 HOURS PRN
Status: DISCONTINUED | OUTPATIENT
Start: 2024-10-09 | End: 2024-10-11 | Stop reason: HOSPADM

## 2024-10-09 RX ORDER — TRAZODONE HYDROCHLORIDE 50 MG/1
100 TABLET ORAL NIGHTLY PRN
Status: DISCONTINUED | OUTPATIENT
Start: 2024-10-09 | End: 2024-10-11 | Stop reason: HOSPADM

## 2024-10-09 RX ORDER — OXYCODONE AND ACETAMINOPHEN 5; 325 MG/1; MG/1
1 TABLET ORAL EVERY 6 HOURS PRN
Status: DISCONTINUED | OUTPATIENT
Start: 2024-10-09 | End: 2024-10-11 | Stop reason: HOSPADM

## 2024-10-09 RX ORDER — NITROGLYCERIN 0.4 MG/1
0.4 TABLET SUBLINGUAL EVERY 5 MIN PRN
Status: DISCONTINUED | OUTPATIENT
Start: 2024-10-09 | End: 2024-10-11 | Stop reason: HOSPADM

## 2024-10-09 RX ORDER — ALBUTEROL SULFATE 90 UG/1
1 INHALANT RESPIRATORY (INHALATION)
Status: DISCONTINUED | OUTPATIENT
Start: 2024-10-09 | End: 2024-10-11 | Stop reason: HOSPADM

## 2024-10-09 RX ORDER — IBUPROFEN 200 MG
200 TABLET ORAL EVERY 6 HOURS PRN
COMMUNITY
End: 2024-10-16 | Stop reason: WASHOUT

## 2024-10-09 SDOH — SOCIAL STABILITY: SOCIAL INSECURITY: WITHIN THE LAST YEAR, HAVE YOU BEEN AFRAID OF YOUR PARTNER OR EX-PARTNER?: NO

## 2024-10-09 SDOH — ECONOMIC STABILITY: FOOD INSECURITY: WITHIN THE PAST 12 MONTHS, YOU WORRIED THAT YOUR FOOD WOULD RUN OUT BEFORE YOU GOT THE MONEY TO BUY MORE.: NEVER TRUE

## 2024-10-09 SDOH — SOCIAL STABILITY: SOCIAL INSECURITY: DO YOU FEEL UNSAFE GOING BACK TO THE PLACE WHERE YOU ARE LIVING?: NO

## 2024-10-09 SDOH — SOCIAL STABILITY: SOCIAL INSECURITY
WITHIN THE LAST YEAR, HAVE YOU BEEN RAPED OR FORCED TO HAVE ANY KIND OF SEXUAL ACTIVITY BY YOUR PARTNER OR EX-PARTNER?: NO

## 2024-10-09 SDOH — SOCIAL STABILITY: SOCIAL INSECURITY: WITHIN THE LAST YEAR, HAVE YOU BEEN HUMILIATED OR EMOTIONALLY ABUSED IN OTHER WAYS BY YOUR PARTNER OR EX-PARTNER?: NO

## 2024-10-09 SDOH — ECONOMIC STABILITY: INCOME INSECURITY: IN THE PAST 12 MONTHS, HAS THE ELECTRIC, GAS, OIL, OR WATER COMPANY THREATENED TO SHUT OFF SERVICE IN YOUR HOME?: NO

## 2024-10-09 SDOH — SOCIAL STABILITY: SOCIAL INSECURITY: DOES ANYONE TRY TO KEEP YOU FROM HAVING/CONTACTING OTHER FRIENDS OR DOING THINGS OUTSIDE YOUR HOME?: NO

## 2024-10-09 SDOH — SOCIAL STABILITY: SOCIAL INSECURITY: ARE YOU OR HAVE YOU BEEN THREATENED OR ABUSED PHYSICALLY, EMOTIONALLY, OR SEXUALLY BY ANYONE?: NO

## 2024-10-09 SDOH — SOCIAL STABILITY: SOCIAL INSECURITY
WITHIN THE LAST YEAR, HAVE YOU BEEN KICKED, HIT, SLAPPED, OR OTHERWISE PHYSICALLY HURT BY YOUR PARTNER OR EX-PARTNER?: NO

## 2024-10-09 SDOH — SOCIAL STABILITY: SOCIAL INSECURITY: HAVE YOU HAD ANY THOUGHTS OF HARMING ANYONE ELSE?: NO

## 2024-10-09 SDOH — SOCIAL STABILITY: SOCIAL INSECURITY: ABUSE: ADULT

## 2024-10-09 SDOH — ECONOMIC STABILITY: INCOME INSECURITY: IN THE PAST 12 MONTHS HAS THE ELECTRIC, GAS, OIL, OR WATER COMPANY THREATENED TO SHUT OFF SERVICES IN YOUR HOME?: NO

## 2024-10-09 SDOH — SOCIAL STABILITY: SOCIAL INSECURITY: HAS ANYONE EVER THREATENED TO HURT YOUR FAMILY OR YOUR PETS?: NO

## 2024-10-09 SDOH — ECONOMIC STABILITY: FOOD INSECURITY: WITHIN THE PAST 12 MONTHS, YOU WORRIED THAT YOUR FOOD WOULD RUN OUT BEFORE YOU GOT MONEY TO BUY MORE.: NEVER TRUE

## 2024-10-09 SDOH — ECONOMIC STABILITY: FOOD INSECURITY: WITHIN THE PAST 12 MONTHS, THE FOOD YOU BOUGHT JUST DIDN'T LAST AND YOU DIDN'T HAVE MONEY TO GET MORE.: NEVER TRUE

## 2024-10-09 SDOH — SOCIAL STABILITY: SOCIAL INSECURITY
WITHIN THE LAST YEAR, HAVE TO BEEN RAPED OR FORCED TO HAVE ANY KIND OF SEXUAL ACTIVITY BY YOUR PARTNER OR EX-PARTNER?: NO

## 2024-10-09 SDOH — SOCIAL STABILITY: SOCIAL INSECURITY: ARE THERE ANY APPARENT SIGNS OF INJURIES/BEHAVIORS THAT COULD BE RELATED TO ABUSE/NEGLECT?: NO

## 2024-10-09 SDOH — SOCIAL STABILITY: SOCIAL INSECURITY: HAVE YOU HAD THOUGHTS OF HARMING ANYONE ELSE?: NO

## 2024-10-09 SDOH — SOCIAL STABILITY: SOCIAL INSECURITY: WERE YOU ABLE TO COMPLETE ALL THE BEHAVIORAL HEALTH SCREENINGS?: YES

## 2024-10-09 SDOH — SOCIAL STABILITY: SOCIAL INSECURITY: DO YOU FEEL ANYONE HAS EXPLOITED OR TAKEN ADVANTAGE OF YOU FINANCIALLY OR OF YOUR PERSONAL PROPERTY?: NO

## 2024-10-09 ASSESSMENT — COGNITIVE AND FUNCTIONAL STATUS - GENERAL
DAILY ACTIVITIY SCORE: 24
MOBILITY SCORE: 24
DAILY ACTIVITIY SCORE: 24
PATIENT BASELINE BEDBOUND: NO
MOBILITY SCORE: 24

## 2024-10-09 ASSESSMENT — HEART SCORE
HEART SCORE: 4
ECG: NORMAL
HISTORY: MODERATELY SUSPICIOUS
RISK FACTORS: >2 RISK FACTORS OR HX OF ATHEROSCLEROTIC DISEASE
TROPONIN: LESS THAN OR EQUAL TO NORMAL LIMIT
AGE: 45-64

## 2024-10-09 ASSESSMENT — PAIN SCALES - GENERAL
PAINLEVEL_OUTOF10: 7
PAINLEVEL_OUTOF10: 4
PAINLEVEL_OUTOF10: 2
PAINLEVEL_OUTOF10: 5 - MODERATE PAIN
PAINLEVEL_OUTOF10: 6
PAINLEVEL_OUTOF10: 8

## 2024-10-09 ASSESSMENT — PAIN - FUNCTIONAL ASSESSMENT
PAIN_FUNCTIONAL_ASSESSMENT: 0-10

## 2024-10-09 ASSESSMENT — PATIENT HEALTH QUESTIONNAIRE - PHQ9
2. FEELING DOWN, DEPRESSED OR HOPELESS: NOT AT ALL
SUM OF ALL RESPONSES TO PHQ9 QUESTIONS 1 & 2: 0
1. LITTLE INTEREST OR PLEASURE IN DOING THINGS: NOT AT ALL

## 2024-10-09 ASSESSMENT — ACTIVITIES OF DAILY LIVING (ADL)
JUDGMENT_ADEQUATE_SAFELY_COMPLETE_DAILY_ACTIVITIES: YES
LACK_OF_TRANSPORTATION: NO
TOILETING: INDEPENDENT
GROOMING: INDEPENDENT
PATIENT'S MEMORY ADEQUATE TO SAFELY COMPLETE DAILY ACTIVITIES?: YES
FEEDING YOURSELF: INDEPENDENT
BATHING: INDEPENDENT
HEARING - LEFT EAR: FUNCTIONAL
WALKS IN HOME: INDEPENDENT
HEARING - RIGHT EAR: FUNCTIONAL
ADEQUATE_TO_COMPLETE_ADL: YES
DRESSING YOURSELF: INDEPENDENT

## 2024-10-09 ASSESSMENT — LIFESTYLE VARIABLES
SKIP TO QUESTIONS 9-10: 1
HAVE PEOPLE ANNOYED YOU BY CRITICIZING YOUR DRINKING: NO
HOW MANY STANDARD DRINKS CONTAINING ALCOHOL DO YOU HAVE ON A TYPICAL DAY: PATIENT DOES NOT DRINK
HOW OFTEN DO YOU HAVE A DRINK CONTAINING ALCOHOL: NEVER
EVER FELT BAD OR GUILTY ABOUT YOUR DRINKING: NO
AUDIT-C TOTAL SCORE: 0
EVER HAD A DRINK FIRST THING IN THE MORNING TO STEADY YOUR NERVES TO GET RID OF A HANGOVER: NO
HAVE YOU EVER FELT YOU SHOULD CUT DOWN ON YOUR DRINKING: NO
HOW OFTEN DO YOU HAVE 6 OR MORE DRINKS ON ONE OCCASION: NEVER
AUDIT-C TOTAL SCORE: 0
TOTAL SCORE: 0

## 2024-10-09 ASSESSMENT — PAIN DESCRIPTION - LOCATION: LOCATION: BACK

## 2024-10-09 ASSESSMENT — PAIN DESCRIPTION - ORIENTATION: ORIENTATION: LOWER;MID

## 2024-10-09 NOTE — ED PROVIDER NOTES
HPI   Chief Complaint   Patient presents with    Chest Pain     Right sided CP x 2wks       Patient is a 54-year-old female with history of COPD diabetes hyperlipidemia hypertension and longtime history of tobacco use who presents today with intermittent chest pain over the past week.  Patient states that she has been having episodes of right sided chest pain that seems to come with exertion.  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  It usually last about 5 minutes and slows down after rest.  She has had about 4-5 episodes a day.  They seem to be getting more frequent.  Her last episode today was around 9-9 30 this morning.  She presented around 1015.  She does not take any antiplatelet therapy.  She is not currently on any therapy for hyperlipidemia or hypertension.  Denies any recent fevers or URI symptoms.  Her COPD is under good control with her inhalers.  She has stopped vaping about 4 weeks ago but continues to smoke cigarettes.  She has strong family history of heart disease with both mother and father.              Patient History   Past Medical History:   Diagnosis Date    Anxiety     Arthritis     COPD (chronic obstructive pulmonary disease) (Multi)     Diabetes mellitus (Multi)     GERD (gastroesophageal reflux disease)     Hemodialysis status (CMS-[facility])     Hyperlipidemia     Hypertension     Irritable bowel syndrome     Neuromuscular disorder (Multi)     Substance addiction (Multi)     12/15/23 states sober for 6 years    Wears glasses      Past Surgical History:   Procedure Laterality Date     SECTION, LOW TRANSVERSE      CHOLECYSTECTOMY      HYSTERECTOMY      TONSILLECTOMY       Family History   Problem Relation Name Age of Onset    Lung cancer Mother      Coronary artery disease Mother      Diabetes type II Mother      Coronary artery disease Father      Diabetes type II Father      Other (sepsis) Sister      Other (chronic kidney disease) Other  "grandfather     Colon cancer Other grandfather     Seizures Sibling      Parkinsonism Sibling       Social History     Tobacco Use    Smoking status: Former     Current packs/day: 0.00     Average packs/day: 0.5 packs/day for 35.0 years (17.5 ttl pk-yrs)     Types: Cigarettes     Start date: 10/1988     Quit date: 10/2023     Years since quittin.0    Smokeless tobacco: Never   Vaping Use    Vaping status: Some Days    Substances: Nicotine    Devices: [employer] tank   Substance Use Topics    Alcohol use: Not Currently    Drug use: Not Currently     Comment: h/o substance abuse -years ago \"crack\"       Physical Exam   ED Triage Vitals [10/09/24 1018]   Temperature Heart Rate Respirations BP   36.8 °C (98.2 °F) 74 18 140/72      Pulse Ox Temp Source Heart Rate Source Patient Position   94 % Temporal Monitor Sitting      BP Location FiO2 (%)     Right arm --       Physical Exam  Vitals and nursing note reviewed.   Constitutional:       General: She is not in acute distress.     Appearance: She is well-developed. She is obese.   HENT:      Head: Normocephalic and atraumatic.   Eyes:      Extraocular Movements: Extraocular movements intact.      Conjunctiva/sclera: Conjunctivae normal.      Pupils: Pupils are equal, round, and reactive to light.   Neck:      Vascular: No JVD.   Cardiovascular:      Rate and Rhythm: Normal rate and regular rhythm. No extrasystoles are present.     Pulses:           Carotid pulses are 2+ on the right side and 2+ on the left side.       Radial pulses are 2+ on the right side and 2+ on the left side.        Dorsalis pedis pulses are 2+ on the right side and 2+ on the left side.      Heart sounds: No murmur heard.  Pulmonary:      Effort: Pulmonary effort is normal. No respiratory distress.      Breath sounds: Normal breath sounds. No decreased breath sounds, wheezing or rhonchi.   Abdominal:      Palpations: Abdomen is soft.      Tenderness: There is no abdominal tenderness. "   Musculoskeletal:         General: No swelling.      Cervical back: Normal range of motion and neck supple.      Right lower leg: No edema.      Left lower leg: No edema.   Skin:     General: Skin is warm and dry.      Capillary Refill: Capillary refill takes 2 to 3 seconds.   Neurological:      General: No focal deficit present.      Mental Status: She is alert.   Psychiatric:         Mood and Affect: Mood normal.           ED Course & MDM   Diagnoses as of 10/09/24 1316   Angina of effort (CMS-[facility])   Diabetes 1.5, managed as type 2 (Multi)   Hyperlipidemia, unspecified hyperlipidemia type   Obesity, Class III, BMI 40-49.9 (morbid obesity) (Multi)   Primary hypertension   Cigarette nicotine dependence without complication                 No data recorded     Roxana Coma Scale Score: 15 (10/09/24 1020 : Maddy Johnson RN)                           Medical Decision Making  Labs Reviewed  CBC WITH AUTO DIFFERENTIAL - Abnormal     WBC                           6.9                    nRBC                          0.0                    RBC                           5.32 (*)               Hemoglobin                    16.0                   Hematocrit                    47.5 (*)               MCV                           89                     MCH                           30.1                   MCHC                          33.7                   RDW                           12.7                   Platelets                     237                    Neutrophils %                 62.1                   Immature Granulocytes %, Automated   0.3                    Lymphocytes %                 29.3                   Monocytes %                   6.1                    Eosinophils %                 1.3                    Basophils %                   0.9                    Neutrophils Absolute          4.30                   Immature Granulocytes Absolute, Au*   0.02                   Lymphocytes Absolute          2.03                    Monocytes Absolute            0.42                   Eosinophils Absolute          0.09                   Basophils Absolute            0.06                COMPREHENSIVE METABOLIC PANEL - Abnormal     Glucose                       159 (*)                Sodium                        139                    Potassium                     4.4                    Chloride                      107                    Bicarbonate                   26                     Anion Gap                     10                     Urea Nitrogen                 14                     Creatinine                    0.73                   eGFR                          >90                    Calcium                       9.9                    Albumin                       4.1                    Alkaline Phosphatase          88                     Total Protein                 6.3 (*)                AST                           13                     Bilirubin, Total              0.5                    ALT                           17                  SERIAL TROPONIN-INITIAL - Normal     Troponin I, High Sensitivity   4                        Narrative: Less than 99th percentile of normal range cutoff-                Female and children under 18 years old <14 ng/L; Male <21 ng/L: Negative                Repeat testing should be performed if clinically indicated.                                 Female and children under 18 years old 14-50 ng/L; Male 21-50 ng/L:                Consistent with possible cardiac damage and possible increased clinical                 risk. Serial measurements may help to assess extent of myocardial damage.                                 >50 ng/L: Consistent with cardiac damage, increased clinical risk and                myocardial infarction. Serial measurements may help assess extent of                 myocardial damage.                                  NOTE: Children less than 1 year old  may have higher baseline troponin                 levels and results should be interpreted in conjunction with the overall                 clinical context.                                 NOTE: Troponin I testing is performed using a different                 testing methodology at [facility] than at other                 [facility] hospitals. Direct result comparisons should only                 be made within the same method.  TROPONIN SERIES- (INITIAL, 1 HR)       Narrative: The following orders were created for panel order Troponin I Series, High Sensitivity (0, 1 HR).                Procedure                               Abnormality         Status                                   ---------                               -----------         ------                                   Troponin I, High Sensiti...[[id number]]  Normal              Final result                             Troponin, High Sensitivi...[[id number]]                                                                               Please view results for these tests on the individual orders.  SERIAL TROPONIN, 1 HOUR     XR chest 1 view   Final Result    Thoracic spine DJD as described. Otherwise, negative exam.          MACRO:    None          Signed by: Norbert Aguirre 10/9/2024 10:44 AM    Dictation workstation:   [id number]     Medications  aspirin chewable tablet 324 mg (324 mg oral Given 10/9/24 1100)    Assessment: Patient presents today with the development of chest pain on exertion.  This has been worsening over the past week.  She is having up to 5 episodes a day.  They occur while she is walking up stairs or walking quickly or for a prolonged time at work.  They usually resolve with rest.  Her latest episode was about 930 this morning.  On evaluation today I do not appreciate any changes in her cardiac enzymes or her EKG that would suggest ACS however given her risk factors and heart her heart score is between 4 and 6.  I feel  that she requires further observation and cardiology evaluation.  Patient was amenable to observation and further cardiac evaluation.  She was pain-free through the emergency department course and had been given her aspirin.  Blood pressure stabilized without intervention.      Amount and/or Complexity of Data Reviewed  ECG/medicine tests: independent interpretation performed.     Details: Sinus rhythm at a rate of 66 bpm, inverted P wave in lead III and aVF.  Normal axis.  Right bundle branch block.  No acute ST or T wave segment change indicate acute coronary syndrome.    EKG #2 sinus rhythm rate is 61 bpm, normal axis.  Right bundle branch block, no acute ST or T wave segment change indicate acute coronary syndrome.        Procedure  Procedures     Latia Santamaria MD  10/09/24 5488

## 2024-10-09 NOTE — H&P
History Of Present Illness  Vannessa Soto is a 54 y.o. female from home with a past medical history of COPD, DM 2, HLD, HTN, active smoker, obesity, who presented with intermittent chest pain and ROMAN within the past couple weeks. Pt reports that she has arthritis and she always has body ache however the chest pain and SOB has been going on for the past 2 weeks. Her breathing is worse with ambulation. Chest pain is mid sternal non radiating, associated with SOB, currently 6/10 in severity.   Patient denies abdominal pain, nausea, vomiting headache, dizziness, fever, chills, cough, bleeding, urinary symptom, LOC, fall  Pt has an active life style and ambulates independently     ED course: /72, HR 74, RR 18, afebrile, O2 sat 94% on room air  Labs: Glucose 159, ,  EKG: RBBB, NSR  CXR: Clear    Patient was admitted for management of chest pain to rule out ACS       Past Medical History  She has a past medical history of Anxiety, Arthritis, COPD (chronic obstructive pulmonary disease) (Multi), Diabetes mellitus (Multi), GERD (gastroesophageal reflux disease), Hemodialysis status (CMS-HCC), Hyperlipidemia, Hypertension, Irritable bowel syndrome, Neuromuscular disorder (Multi), Substance addiction (Multi), and Wears glasses.    Surgical History  She has a past surgical history that includes Cholecystectomy; Tonsillectomy; Hysterectomy; and  section, low transverse.     Social History  She reports that she quit smoking about a year ago. Her smoking use included cigarettes. She started smoking about 36 years ago. She has a 17.5 pack-year smoking history. She has never used smokeless tobacco. She reports that she does not currently use alcohol. She reports that she does not currently use drugs.    Family History  Family History   Problem Relation Name Age of Onset    Lung cancer Mother      Coronary artery disease Mother      Diabetes type II Mother      Coronary artery disease Father      Diabetes  type II Father      Other (sepsis) Sister      Other (chronic kidney disease) Other grandfather     Colon cancer Other grandfather     Seizures Sibling      Parkinsonism Sibling          Allergies  Patient has no known allergies.    Review of Systems  10 points ROS is negative except as mentioned per HPI     Physical Exam    General: Well-developed obese female, has mild respiratory distress, on room air  HEENT: AT, NC, no JVD, no lymphadenopathy, neck supple  Lungs: Bilateral coarse breath sound noted  Cardiac: Normal S1-S2, no murmur, no gallop  Abdomen: Soft, nontender, no distention, positive bowel sound  Extremities: No deformity, no edema, pulses intact, ROM intact  Neurological: Alert awake oriented x3, sensation intact, clear speech       Last Recorded Vitals  BP (!) 180/93   Pulse 67   Temp 36.8 °C (98.2 °F) (Temporal)   Resp 18   Wt 90.7 kg (200 lb)   SpO2 98%       Assessment & Plan  Chest pain      Vannessa Soto is a 54 y.o. female from home with a past medical history of COPD, DM 2, HLD, HTN, active smoker, obesity, who was admitted for management of chest pain to rule out ACS    Telemetry monitor, pain management, antiemetic as needed  Aspirin, Lipitor, nitroglycerin  Oxygen therapy as needed  Will check lipid panel, A1c, BMP, TSH  Trend tropes  Will provide echocardiogram  Cardiology consulted  ISS, hypoglycemia protocol, POC glucose, DM diet  Continue with home meds for other comorbidities  Nicotine patch daily  Pt needs outpatient fu with pulm for PFT and sleep study   VTE prophylaxis: Lovenox subcu  Disposition: Home once hemodynamically stable  Pt might need home O2 eval on discharge       Nunu Underwood MD

## 2024-10-09 NOTE — NURSING NOTE
Rapid Response RN     Rapid Response Note:     Rapid response called at 1925 for patient complaints of chest pain. Patient stated chest pain 10/10. Patient hypertensive and verbalized anxiety. Dr. Guzmán at bedside to assess patient.  PRN medications given and EKG obtained. See Rapid response documentation.     After PRN medications given, patient verbalizes decrease in pain. Vital signs WNL at this time.

## 2024-10-10 ENCOUNTER — APPOINTMENT (OUTPATIENT)
Dept: CARDIOLOGY | Facility: HOSPITAL | Age: 54
End: 2024-10-10
Payer: COMMERCIAL

## 2024-10-10 ENCOUNTER — PHARMACY VISIT (OUTPATIENT)
Dept: PHARMACY | Facility: CLINIC | Age: 54
End: 2024-10-10
Payer: COMMERCIAL

## 2024-10-10 PROBLEM — I20.89 ANGINA OF EFFORT (CMS-HCC): Status: ACTIVE | Noted: 2024-10-10

## 2024-10-10 LAB
ANION GAP SERPL CALC-SCNC: 9 MMOL/L (ref 10–20)
AORTIC VALVE MEAN GRADIENT: 7 MMHG
AORTIC VALVE PEAK VELOCITY: 1.89 M/S
AV PEAK GRADIENT: 14.3 MMHG
AVA (PEAK VEL): 1.4 CM2
AVA (VTI): 1.45 CM2
BUN SERPL-MCNC: 19 MG/DL (ref 6–23)
CALCIUM SERPL-MCNC: 9.6 MG/DL (ref 8.6–10.3)
CARDIAC TROPONIN I PNL SERPL HS: 3 NG/L (ref 0–13)
CHLORIDE SERPL-SCNC: 105 MMOL/L (ref 98–107)
CO2 SERPL-SCNC: 29 MMOL/L (ref 21–32)
CREAT SERPL-MCNC: 0.83 MG/DL (ref 0.5–1.05)
EGFRCR SERPLBLD CKD-EPI 2021: 83 ML/MIN/1.73M*2
EJECTION FRACTION APICAL 4 CHAMBER: 67.5
EJECTION FRACTION: 60 %
ERYTHROCYTE [DISTWIDTH] IN BLOOD BY AUTOMATED COUNT: 12.7 % (ref 11.5–14.5)
EST. AVERAGE GLUCOSE BLD GHB EST-MCNC: 177 MG/DL
GLUCOSE BLD MANUAL STRIP-MCNC: 164 MG/DL (ref 74–99)
GLUCOSE BLD MANUAL STRIP-MCNC: 175 MG/DL (ref 74–99)
GLUCOSE BLD MANUAL STRIP-MCNC: 176 MG/DL (ref 74–99)
GLUCOSE BLD MANUAL STRIP-MCNC: 184 MG/DL (ref 74–99)
GLUCOSE SERPL-MCNC: 200 MG/DL (ref 74–99)
HBA1C MFR BLD: 7.8 %
HCT VFR BLD AUTO: 45.1 % (ref 36–46)
HGB BLD-MCNC: 14.7 G/DL (ref 12–16)
HOLD SPECIMEN: NORMAL
LEFT ATRIUM VOLUME AREA LENGTH INDEX BSA: 18.4 ML/M2
LEFT VENTRICLE INTERNAL DIMENSION DIASTOLE: 3.71 CM (ref 3.5–6)
LEFT VENTRICULAR OUTFLOW TRACT DIAMETER: 1.78 CM
LV EJECTION FRACTION BIPLANE: 68 %
MAGNESIUM SERPL-MCNC: 1.92 MG/DL (ref 1.6–2.4)
MCH RBC QN AUTO: 29.8 PG (ref 26–34)
MCHC RBC AUTO-ENTMCNC: 32.6 G/DL (ref 32–36)
MCV RBC AUTO: 91 FL (ref 80–100)
MITRAL VALVE E/A RATIO: 1.09
NRBC BLD-RTO: 0 /100 WBCS (ref 0–0)
PLATELET # BLD AUTO: 205 X10*3/UL (ref 150–450)
POTASSIUM SERPL-SCNC: 4.4 MMOL/L (ref 3.5–5.3)
RBC # BLD AUTO: 4.94 X10*6/UL (ref 4–5.2)
RIGHT VENTRICLE FREE WALL PEAK S': 13.5 CM/S
SODIUM SERPL-SCNC: 139 MMOL/L (ref 136–145)
TRICUSPID ANNULAR PLANE SYSTOLIC EXCURSION: 2.1 CM
WBC # BLD AUTO: 6.5 X10*3/UL (ref 4.4–11.3)

## 2024-10-10 PROCEDURE — 83735 ASSAY OF MAGNESIUM: CPT | Performed by: NURSE PRACTITIONER

## 2024-10-10 PROCEDURE — 99232 SBSQ HOSP IP/OBS MODERATE 35: CPT | Performed by: STUDENT IN AN ORGANIZED HEALTH CARE EDUCATION/TRAINING PROGRAM

## 2024-10-10 PROCEDURE — 93306 TTE W/DOPPLER COMPLETE: CPT | Performed by: INTERNAL MEDICINE

## 2024-10-10 PROCEDURE — 2500000004 HC RX 250 GENERAL PHARMACY W/ HCPCS (ALT 636 FOR OP/ED): Performed by: STUDENT IN AN ORGANIZED HEALTH CARE EDUCATION/TRAINING PROGRAM

## 2024-10-10 PROCEDURE — 93005 ELECTROCARDIOGRAM TRACING: CPT

## 2024-10-10 PROCEDURE — 85027 COMPLETE CBC AUTOMATED: CPT | Performed by: STUDENT IN AN ORGANIZED HEALTH CARE EDUCATION/TRAINING PROGRAM

## 2024-10-10 PROCEDURE — 99222 1ST HOSP IP/OBS MODERATE 55: CPT | Performed by: INTERNAL MEDICINE

## 2024-10-10 PROCEDURE — 2500000001 HC RX 250 WO HCPCS SELF ADMINISTERED DRUGS (ALT 637 FOR MEDICARE OP): Performed by: STUDENT IN AN ORGANIZED HEALTH CARE EDUCATION/TRAINING PROGRAM

## 2024-10-10 PROCEDURE — 2500000001 HC RX 250 WO HCPCS SELF ADMINISTERED DRUGS (ALT 637 FOR MEDICARE OP): Performed by: NURSE PRACTITIONER

## 2024-10-10 PROCEDURE — 2500000005 HC RX 250 GENERAL PHARMACY W/O HCPCS: Performed by: STUDENT IN AN ORGANIZED HEALTH CARE EDUCATION/TRAINING PROGRAM

## 2024-10-10 PROCEDURE — 80048 BASIC METABOLIC PNL TOTAL CA: CPT | Performed by: STUDENT IN AN ORGANIZED HEALTH CARE EDUCATION/TRAINING PROGRAM

## 2024-10-10 PROCEDURE — 1100000001 HC PRIVATE ROOM DAILY

## 2024-10-10 PROCEDURE — S4991 NICOTINE PATCH NONLEGEND: HCPCS | Performed by: STUDENT IN AN ORGANIZED HEALTH CARE EDUCATION/TRAINING PROGRAM

## 2024-10-10 PROCEDURE — 93306 TTE W/DOPPLER COMPLETE: CPT

## 2024-10-10 PROCEDURE — 93010 ELECTROCARDIOGRAM REPORT: CPT | Performed by: INTERNAL MEDICINE

## 2024-10-10 PROCEDURE — RXMED WILLOW AMBULATORY MEDICATION CHARGE

## 2024-10-10 PROCEDURE — 36415 COLL VENOUS BLD VENIPUNCTURE: CPT | Performed by: STUDENT IN AN ORGANIZED HEALTH CARE EDUCATION/TRAINING PROGRAM

## 2024-10-10 PROCEDURE — 2500000004 HC RX 250 GENERAL PHARMACY W/ HCPCS (ALT 636 FOR OP/ED): Performed by: NURSE PRACTITIONER

## 2024-10-10 PROCEDURE — 84484 ASSAY OF TROPONIN QUANT: CPT | Performed by: STUDENT IN AN ORGANIZED HEALTH CARE EDUCATION/TRAINING PROGRAM

## 2024-10-10 PROCEDURE — 2500000002 HC RX 250 W HCPCS SELF ADMINISTERED DRUGS (ALT 637 FOR MEDICARE OP, ALT 636 FOR OP/ED): Performed by: STUDENT IN AN ORGANIZED HEALTH CARE EDUCATION/TRAINING PROGRAM

## 2024-10-10 PROCEDURE — 82947 ASSAY GLUCOSE BLOOD QUANT: CPT

## 2024-10-10 RX ORDER — AMINOPHYLLINE 25 MG/ML
125 INJECTION, SOLUTION INTRAVENOUS AS NEEDED
Status: CANCELLED | OUTPATIENT
Start: 2024-10-10

## 2024-10-10 RX ORDER — LISINOPRIL 5 MG/1
5 TABLET ORAL DAILY
Status: DISCONTINUED | OUTPATIENT
Start: 2024-10-10 | End: 2024-10-11 | Stop reason: HOSPADM

## 2024-10-10 RX ORDER — EMPAGLIFLOZIN 25 MG/1
25 TABLET, FILM COATED ORAL DAILY
Qty: 30 TABLET | Refills: 1 | Status: SHIPPED | OUTPATIENT
Start: 2024-10-10 | End: 2024-12-09

## 2024-10-10 RX ORDER — KETOROLAC TROMETHAMINE 30 MG/ML
30 INJECTION, SOLUTION INTRAMUSCULAR; INTRAVENOUS ONCE
Status: DISCONTINUED | OUTPATIENT
Start: 2024-10-10 | End: 2024-10-10

## 2024-10-10 RX ORDER — ONDANSETRON HYDROCHLORIDE 2 MG/ML
4 INJECTION, SOLUTION INTRAVENOUS EVERY 6 HOURS PRN
Status: DISCONTINUED | OUTPATIENT
Start: 2024-10-10 | End: 2024-10-11 | Stop reason: HOSPADM

## 2024-10-10 RX ORDER — KETOROLAC TROMETHAMINE 30 MG/ML
30 INJECTION, SOLUTION INTRAMUSCULAR; INTRAVENOUS ONCE
Status: COMPLETED | OUTPATIENT
Start: 2024-10-10 | End: 2024-10-10

## 2024-10-10 RX ORDER — ONDANSETRON 4 MG/1
4 TABLET, FILM COATED ORAL 4 TIMES DAILY PRN
Status: DISCONTINUED | OUTPATIENT
Start: 2024-10-10 | End: 2024-10-11 | Stop reason: HOSPADM

## 2024-10-10 ASSESSMENT — COGNITIVE AND FUNCTIONAL STATUS - GENERAL
DAILY ACTIVITIY SCORE: 24
MOBILITY SCORE: 24
DAILY ACTIVITIY SCORE: 24
MOBILITY SCORE: 24

## 2024-10-10 ASSESSMENT — PAIN SCALES - GENERAL
PAINLEVEL_OUTOF10: 6
PAINLEVEL_OUTOF10: 5 - MODERATE PAIN
PAINLEVEL_OUTOF10: 1
PAINLEVEL_OUTOF10: 8
PAINLEVEL_OUTOF10: 8
PAINLEVEL_OUTOF10: 1
PAINLEVEL_OUTOF10: 7

## 2024-10-10 ASSESSMENT — PAIN - FUNCTIONAL ASSESSMENT
PAIN_FUNCTIONAL_ASSESSMENT: 0-10

## 2024-10-10 ASSESSMENT — PAIN DESCRIPTION - ORIENTATION: ORIENTATION: LOWER;MID

## 2024-10-10 ASSESSMENT — PAIN DESCRIPTION - LOCATION
LOCATION: BACK
LOCATION: BACK

## 2024-10-10 NOTE — CARE PLAN
The patient's goals for the shift include      The clinical goals for the shift include pt will be free from chest pain through shift      Problem: Diabetes  Goal: Achieve decreasing blood glucose levels by end of shift  Outcome: Progressing  Goal: Increase stability of blood glucose readings by end of shift  Outcome: Progressing  Goal: Decrease in ketones present in urine by end of shift  Outcome: Progressing  Goal: Maintain electrolyte levels within acceptable range throughout shift  Outcome: Progressing  Goal: Maintain glucose levels >70mg/dl to <250mg/dl throughout shift  Outcome: Progressing  Goal: No changes in neurological exam by end of shift  Outcome: Progressing  Goal: Learn about and adhere to nutrition recommendations by end of shift  Outcome: Progressing  Goal: Vital signs within normal range for age by end of shift  Outcome: Progressing  Goal: Increase self care and/or family involovement by end of shift  Outcome: Progressing  Goal: Receive DSME education by end of shift  Outcome: Progressing     Problem: Pain - Adult  Goal: Verbalizes/displays adequate comfort level or baseline comfort level  Outcome: Progressing     Problem: Safety - Adult  Goal: Free from fall injury  Outcome: Progressing     Problem: Discharge Planning  Goal: Discharge to home or other facility with appropriate resources  Outcome: Progressing     Problem: Chronic Conditions and Co-morbidities  Goal: Patient's chronic conditions and co-morbidity symptoms are monitored and maintained or improved  Outcome: Progressing     Problem: Pain  Goal: Takes deep breaths with improved pain control throughout the shift  Outcome: Progressing  Goal: Turns in bed with improved pain control throughout the shift  Outcome: Progressing  Goal: Walks with improved pain control throughout the shift  Outcome: Progressing  Goal: Performs ADL's with improved pain control throughout shift  Outcome: Progressing  Goal: Participates in PT with improved pain  control throughout the shift  Outcome: Progressing  Goal: Free from opioid side effects throughout the shift  Outcome: Progressing  Goal: Free from acute confusion related to pain meds throughout the shift  Outcome: Progressing

## 2024-10-10 NOTE — CONSULTS
Cardiology Consult Note      Date:   10/10/2024  Patient name:  Vannessa Soto  Date of admission:  10/9/2024 10:06 AM  MRN:   [id number]  YOB: 1970  Time of Consult:  9:38 AM    Consulting Cardiologist: Dr. Neil Munoz, APRN, CNP  Primary Cardiologist:   None     Referring Provider: Dr Underwood      Admission Diagnosis:     Chest pain      History of Present Illness:      Vannessa Soto is a 54 y.o.  female patient who is being at the request of Dr. Underwood for inpatient consultation of angina. She was admitted on 10/9/2024.  Previous [facility] and [facility] records have been reviewed in detail.    Patient with a history of diabetes, hypertension, dyslipidemia, tobacco abuse, COPD  Patient states approximately 2 weeks ago she developed right-sided chest pain sort of was coming and going in nature very atypical in nature it is reproducible pain.  She states that she lives on the second floor and then the last couple weeks she has had increased shortness of breath she states that she does have COPD that she does have shortness of breath anyways where she thought it was a little worse so she came into the emergency department admitted to the ninth floor asked cardiology get involved with her case.  Again chest pain is reproducible right side atypical in nature.  No fever, chills, nausea, vomiting, PND, orthopnea, claudications  Positive for right sided chest pain is reproducible, shortness of breath    EKG is sinus bradycardia right bundle branch block  Cardiac history  Troponin 4, 3  BNP 40    None    Allergies:     No Known Allergies      Past Medical History:     Past Medical History:   Diagnosis Date    Anxiety     Arthritis     COPD (chronic obstructive pulmonary disease) (Multi)     Diabetes mellitus (Multi)     GERD (gastroesophageal reflux disease)     Hemodialysis status (CMS-HCC)     Hyperlipidemia     Hypertension     Irritable bowel syndrome     Neuromuscular disorder (Multi)  "    Substance addiction (Multi)     12/15/23 states sober for 6 years    Wears glasses        Past Surgical History:     Past Surgical History:   Procedure Laterality Date     SECTION, LOW TRANSVERSE      CHOLECYSTECTOMY      HYSTERECTOMY      TONSILLECTOMY         Family History:     Family History   Problem Relation Name Age of Onset    Lung cancer Mother      Coronary artery disease Mother      Diabetes type II Mother      Coronary artery disease Father      Diabetes type II Father      Other (sepsis) Sister      Other (chronic kidney disease) Other grandfather     Colon cancer Other grandfather     Seizures Sibling      Parkinsonism Sibling         Social History:     Social History     Tobacco Use    Smoking status: Former     Current packs/day: 0.00     Average packs/day: 0.5 packs/day for 35.0 years (17.5 ttl pk-yrs)     Types: Cigarettes     Start date: 10/1988     Quit date: 10/2023     Years since quittin.0    Smokeless tobacco: Never   Vaping Use    Vaping status: Some Days    Substances: Nicotine    Devices: [employer]   Substance Use Topics    Alcohol use: Not Currently    Drug use: Not Currently     Comment: h/o substance abuse -years ago \"crack\"       CURRENT INPATIENT MEDICATIONS    albuterol, 1 puff, inhalation, TID  aspirin, 81 mg, oral, Daily  atorvastatin, 40 mg, oral, Nightly  empagliflozin, 25 mg, oral, Daily  enoxaparin, 40 mg, subcutaneous, q24h  hydroCHLOROthiazide, 12.5 mg, oral, Daily  insulin lispro, 0-10 Units, subcutaneous, TID  montelukast, 10 mg, oral, Nightly  nicotine, 1 patch, transdermal, Daily  pantoprazole, 40 mg, oral, BID         Current Outpatient Medications   Medication Instructions    albuterol 90 mcg/actuation inhaler 1 puff, inhalation, 3 times daily RT    atorvastatin (LIPITOR) 40 mg, oral, Daily    Combivent Respimat  mcg/actuation inhaler 1 puff, inhalation, 4 times daily RT    diclofenac sodium (Voltaren) 1 % gel gel 1 Application, Topical, " Daily, Apply to the affected area    fluticasone (Flonase) 50 mcg/actuation nasal spray 2 sprays, Each Nostril, Daily, Shake gently. Before first use, prime pump. After use, clean tip and replace cap.    glipiZIDE (GLUCOTROL) 5 mg, oral, 3 times daily    hydroCHLOROthiazide (MICROZIDE) 12.5 mg, oral, Daily    hydrOXYzine pamoate (VISTARIL) 25 mg, oral, 3 times daily PRN    ibuprofen 600 mg, oral, Every 6 hours PRN    ibuprofen 200 mg, oral, Every 6 hours PRN    Jardiance 25 mg, oral, Daily    lisinopril 5 mg, oral, Daily    metoclopramide (REGLAN) 10 mg, oral, Daily PRN    montelukast (SINGULAIR) 10 mg, oral, Nightly    pantoprazole (ProtoNix) 40 mg EC tablet TAKE 1 TABLET BY MOUTH TWICE DAILY    Victoza 3-Stephen 1.8 mg, subcutaneous, Daily        Review of Systems:      12 point review of systems was obtained in detail and is negative other than that detailed above.    Vital Signs:     Vitals:    10/09/24 2100 10/10/24 0034 10/10/24 0425 10/10/24 0858   BP:  132/80 117/59 124/59   BP Location:  Right arm Right arm    Patient Position:  Lying Lying Sitting   Pulse:  64 57 61   Resp:  20 18 12   Temp:  36 °C (96.8 °F) 36 °C (96.8 °F) 36 °C (96.8 °F)   TempSrc:  Temporal Temporal    SpO2: 93% 95% 96% 96%   Weight:       Height:         No intake or output data in the 24 hours ending 10/10/24 0938    Wt Readings from Last 4 Encounters:   10/09/24 93.9 kg (207 lb 0.2 oz)   09/24/24 91.6 kg (202 lb)   08/21/24 95.3 kg (210 lb)   06/03/24 92.1 kg (203 lb)       Physical Examination:     GENERAL APPEARANCE: Well developed, well nourished, in no acute distress.  CHEST: Symmetric and tender right sided  INTEGUMENT: Skin warm and dry, without gross excoriationis or lesions.  HEENT: No gross abnormalities of conjunctiva, teeth, gums, oral mucosa  NECK: Supple, no JVD, no bruit. Thyroid not palpable. Carotid upstrokes normal.  NEURO/PSHCY: Alert and oriented x3; appropriate behavior and responses and responses, grossly normal  cerebellar function with normal balance and coordination  LUNGS: Diminished in the bases  HEART: Rate and rhythm regular with no evident murmur; no gallop appreciated. There are no rubs, clicks or heaves. PMI nondisplaced.  ABDOMEN: Soft, nontender, no palpable hepatosplenomegaly, no mases, no bruits. Abdominal aorta not noted to be enlarged.  MUSCULOSKELETAL: Ambulatory with normal tandem gait.  EXTREMITIES: Warm with good color, no clubbing or cyanois. There is no edema noted.  PERIPHERAL VASCULAR: Pulses present and equally palpable; 2+ throughout. No femoral bruits.      Lab:     CBC:   Results from last 7 days   Lab Units 10/10/24  0532 10/09/24  1029   WBC AUTO x10*3/uL 6.5 6.9   RBC AUTO x10*6/uL 4.94 5.32*   HEMOGLOBIN g/dL 14.7 16.0   HEMATOCRIT % 45.1 47.5*   MCV fL 91 89   MCH pg 29.8 30.1   MCHC g/dL 32.6 33.7   RDW % 12.7 12.7   PLATELETS AUTO x10*3/uL 205 237     CMP:    Results from last 7 days   Lab Units 10/10/24  0532 10/09/24  1034   SODIUM mmol/L 139 139   POTASSIUM mmol/L 4.4 4.4   CHLORIDE mmol/L 105 107   CO2 mmol/L 29 26   BUN mg/dL 19 14   CREATININE mg/dL 0.83 0.73   GLUCOSE mg/dL 200* 159*   PROTEIN TOTAL g/dL  --  6.3*   CALCIUM mg/dL 9.6 9.9   BILIRUBIN TOTAL mg/dL  --  0.5   ALK PHOS U/L  --  88   AST U/L  --  13   ALT U/L  --  17     BMP:    Results from last 7 days   Lab Units 10/10/24  0532 10/09/24  1034   SODIUM mmol/L 139 139   POTASSIUM mmol/L 4.4 4.4   CHLORIDE mmol/L 105 107   CO2 mmol/L 29 26   BUN mg/dL 19 14   CREATININE mg/dL 0.83 0.73   CALCIUM mg/dL 9.6 9.9   GLUCOSE mg/dL 200* 159*     Magnesium:  Results from last 7 days   Lab Units 10/10/24  0532   MAGNESIUM mg/dL 1.92     Troponin:    Results from last 7 days   Lab Units 10/10/24  0532 10/09/24  1227 10/09/24  1034   TROPHS ng/L 3 3 4     BNP:   Results from last 7 days   Lab Units 10/09/24  1029   BNP pg/mL 40     Lipid Panel:  Results from last 7 days   Lab Units 10/09/24  1227   HDL mg/dL 35.0   CHOLESTEROL/HDL  RATIO  5.5   VLDL mg/dL 28   TRIGLYCERIDES mg/dL 142   NON HDL CHOL. mg/dL 158*        Diagnostic Studies:   @No results found for this or any previous visit.    ECG 12 Lead    Result Date: 10/10/2024  Sinus bradycardia Right bundle branch block Possible Inferior infarct (cited on or before 10-OCT-2024) Abnormal ECG When compared with ECG of 10-OCT-2024 09:21, (unconfirmed) No significant change was found    ECG 12 lead    Result Date: 10/9/2024  Normal sinus rhythm Right bundle branch block Abnormal ECG When compared with ECG of 09-OCT-2024 10:10, Sinus rhythm has replaced Ectopic atrial rhythm See ED provider note for full interpretation and clinical correlation Confirmed by Marylou Romero ([id number]) on 10/9/2024 12:01:22 PM    ECG 12 lead    Result Date: 10/9/2024  Unusual P axis, possible ectopic atrial rhythm Right bundle branch block Abnormal ECG When compared with ECG of 21-AUG-2024 18:31, Premature ventricular complexes are no longer Present See ED provider note for full interpretation and clinical correlation Confirmed by Marylou Romero ([id number]) on 10/9/2024 11:09:43 AM    XR chest 1 view    Result Date: 10/9/2024  Interpreted By:  Norbert Aguirre, STUDY: XR CHEST 1 VIEW;  10/9/2024 10:42 am   INDICATION: Signs/Symptoms:Chest Pain.   COMPARISON: CT abdomen pelvis dated 08/21/2024.   ACCESSION NUMBER(S): [id number]   ORDERING CLINICIAN: EVIE CASTANEDA   TECHNIQUE: Single AP portable view of the chest was obtained.   FINDINGS: MEDIASTINUM/ LUNGS/ MANUEL: No cardiomegaly, vascular congestion, or pleural effusion. No abnormal opacity in either lung worrisome for tumor or pneumonia. No pneumothorax. No tracheal deviation. No abnormal hilar fullness or gross mass on either side.   BONES: No lytic or blastic destructive bone lesion.  There is mild-to-moderate disc space narrowing and endplate osteophytosis throughout the thoracic spine.   UPPER ABDOMEN: Grossly intact.       Thoracic spine DJD as described.  Otherwise, negative exam.   MACRO: None   Signed by: Norbert Aguirre 10/9/2024 10:44 AM Dictation workstation:   [id number]      No echocardiogram results found for the past 14 days    Radiology:     Transthoracic Echo (TTE) Complete         XR chest 1 view   Final Result   Thoracic spine DJD as described. Otherwise, negative exam.        MACRO:   None        Signed by: Norbert Aguirre 10/9/2024 10:44 AM   Dictation workstation:   [id number]          Problem List:     Patient Active Problem List   Diagnosis    Diabetes 1.5, managed as type 2 (Multi)    Elevated lipoprotein(a)    Obesity    Other emphysema (Multi)    Osteoarthritis of shoulder    Asthma    Anxiety    Atypical chest pain    Bilateral carpal tunnel syndrome    Cholelithiases    Current mild episode of major depressive disorder without prior episode (CMS-HCC)    Cyst of skin    Osteoarthritis    Degenerative joint disease involving multiple joints    Diabetes mellitus type 2 in obese    Type 2 diabetes mellitus with hyperosmolarity without coma, without long-term current use of insulin (Multi)    Epigastric pain    Gastroesophageal reflux disease without esophagitis    Hepatomegaly    Left shoulder pain    Mild intermittent asthma without complication ([facility]-H[facility])    Neuropathy    Nicotine dependence in remission    Primary hypertension    Shoulder arthritis    Simple chronic bronchitis (Multi)    Hypercholesterolemia    Hyperlipidemia    Obesity, Class III, BMI 40-49.9 (morbid obesity) (Multi)    Class 2 obesity with body mass index (BMI) of 37.0 to 37.9 in adult    Scalp cyst    Chest pain       Assessment:   Atypical chest pain  Hypertension  Diabetes  Dyslipidemia  COPD      Plan:   Tele monitoring  Serial enzymes  2d echo  Daily EKG's  Aspirin 81 mg daily  Lipitor 40 mg daily  HCTZ 12.5 mg daily  Lisinopril 5 mg daily  Toradol 30 mg IV push x 1  Heating pad to right chest wall area  Keep magnesium greater than 2.0  Further recommendations per   Tamika Munoz CNP  [facility]    CARDIOLOGIST NOTE  I have personally seen and examined patient as well as personally formulated treatment plan.  I have reviewed this note as created by CHAVO Jo CNP and agree with his findings and physical exam.      54-year-old woman without known coronary disease but risk factors including hypertension diabetes hyperlipidemia and tobacco abuse which is a half a pack of cigarettes a day for over 30 years.  She presents primarily because of chest discomfort her primary description is that it hurts when she turns a certain way takes a deep breath coughs pushes on her chest wall on the right side.  However she also notes some discomfort in the same area if she walks for a fast.  This may or may not be the same discomfort as she gets when she pushes on her chest.  This has been present for some time.    Exam: Obese, comfortable lungs are clear slightly deep breast breath sounds cardiac exam shows a regular rhythm and rate no murmur gallop appreciated abdominal exam is soft extremities show no edema pulses are 2+.    Troponins are normal  ECG shows right bundle branch block sinus rhythm no change from prior studies    Impression  Patient with chest pain of 1 aspect is clearly musculoskeletal.  She has exertional symptoms which could also be musculoskeletal as though it is in the same area but I am not convinced all of that exertional discomfort is the same as her clear-cut costochondritis.  She has completely normal troponins despite prolonged pain there is no evidence of infarction or ischemia on ECG or by enzymes.  Given her high risk would pursue stress perfusion could switch to Lexiscan if necessary.  Echo shows no wall motion abnormalities or valvular heart disease.  She needs to stop smoking cigarettes.  Will optimize treatment of blood pressure and lipids.  Probably discharge tomorrow after stress  test    Neil Lundberg MD        Of note, this documentation is completed using the Dragon Dictation system (voice recognition software). There may be spelling and/or grammatical errors that were not corrected prior to final submission.      Electronically signed by RAND Enrique, on 10/10/2024 at 9:38 AM

## 2024-10-10 NOTE — SIGNIFICANT EVENT
Rapid response called at 1925 due to patient complaining of 10 out of 10 chest pain.  EKG was obtained which showed no ischemic changes and right bundle branch block unchanged from admission EKG.  Notably troponin was negative x 2 on admission.  Patient was given sublingual nitroglycerin x 2 with improvement in pain.  She was also given an additional dose of morphine.  Will keep the patient on telemetry and repeat EKG as needed for chest pain, cardiology consult is pending in the morning tomorrow.    Gasper Guzmán MD

## 2024-10-10 NOTE — NURSING NOTE
Patient states she is having difficulty with the affordability of her Jardiance. Had a script for Jardiance sent to [facility] by provider. Per [facility], the cost would be $557.62 due to deductible. We were able to provide a 14 day free trial to the patient and also provided a $10 copay card for use on next fill.

## 2024-10-10 NOTE — PROGRESS NOTES
Haylee Soto is a 54 y.o. female on day 0 of admission presenting with Chest pain.      Subjective   Overnight patient developed 10/10 severity of chest pain was given nitroglycerin and her chest pain improved.  Currently denying any chest pain or abdominal pain.  She still has mild shortness of breath especially with exertion.    Objective     Last Recorded Vitals  /54   Pulse 61   Temp 35.8 °C (96.4 °F)   Resp 20   Wt 93.9 kg (207 lb 0.2 oz)   SpO2 95%   Intake/Output last 3 Shifts:  No intake or output data in the 24 hours ending 10/10/24 1255    Admission Weight  Weight: 90.7 kg (200 lb) (10/09/24 1018)    Daily Weight  10/09/24 : 93.9 kg (207 lb 0.2 oz)    Image Results  Transthoracic Echo (TTE) [place] [id number]   Tel [phone] Fax [phone]    TRANSTHORACIC ECHOCARDIOGRAM REPORT    Patient Name:      HAYLEE SOTO    Reading Physician:    [id number] Chapin DO  Study Date:        10/10/2024           Ordering Provider:    [id number] YANY GALLOWAY  MRN/PID:           [id number]             Fellow:  Accession#:        [id number]         Nurse:                Lior Beck RN  Date of Birth/Age: 1970 / 54 years  Sonographer:          Melinda Grant RDCS  Gender:            F                    Additional Staff:  Height:            157.48 cm            Admit Date:           10/9/2024  Weight:            90.72 kg             Admission Status:     Inpatient -                                                                Routine  BSA / BMI:         1.91 m2 / 36.58      Department Location:  [place] [address]                                      Echo Lab  Blood Pressure: 140 /72 mmHg    Study Type:     TRANSTHORACIC ECHO (TTE) COMPLETE  Diagnosis/ICD: Chest pain, unspecified-R07.9  Indication:    Chest Pain  CPT Codes:     Echo Complete w Full [id number]    Patient History:  Smoker:            Current.  Diabetes:          Yes  Pertinent History: HTN, Hyperlipidemia, Chest Pain and COPD.    Study Detail: The following Echo studies were performed: 2D, M-Mode, Doppler and                color flow. Definity used as a contrast agent for endocardial                border definition. Total contrast used for this procedure was 1 mL                via IV push. The patient was awake.       PHYSICIAN INTERPRETATION:  Left Ventricle: The left ventricular systolic function is normal, with a visually estimated ejection fraction of 60%. The left ventricle was not well visualized. The left ventricular ejection fraction could not be measured. There are no regional wall motion abnormalities. The left ventricular cavity size is normal. Spectral Doppler shows a normal pattern of left ventricular diastolic filling.  LV Wall Scoring:  All segments are normal.    Left Atrium: The left atrium is normal in size.  Right Ventricle: The right ventricle is normal in size. There is normal right ventricular global systolic function.  Right Atrium: The right atrium is normal in size.  Aortic Valve: The aortic valve appears structurally normal. There is minimal aortic valve cusp calcification. There is no evidence of aortic valve stenosis.  The aortic valve dimensionless index is 0.58. There is no evidence of aortic valve regurgitation. The peak instantaneous gradient of the aortic valve is 14.3 mmHg. The mean gradient of the aortic valve is 7.0 mmHg.  Mitral Valve: The mitral valve is normal in structure. There is no evidence of mitral valve stenosis. There is normal mitral valve leaflet mobility. There is trace mitral valve regurgitation.  Tricuspid Valve: The tricuspid valve is structurally normal. There is normal tricuspid valve leaflet  mobility. There is trace tricuspid regurgitation.  Pulmonic Valve: The pulmonic valve is structurally normal. There is no indication of pulmonic valve regurgitation.  Pericardium: No pericardial effusion noted.  Aorta: The aortic root is normal.  Pulmonary Artery: The pulmonary artery is not well visualized.  Systemic Veins: The inferior vena cava appears normal in size.  In comparison to the previous echocardiogram(s): There are no prior studies on this patient for comparison purposes.       CONCLUSIONS:   1. The left ventricle was not well visualized. The left ventricular ejection fraction could not be measured.   2. The left ventricular systolic function is normal, with a visually estimated ejection fraction of 60%.   3. No regional wall motion abnormalities.   4. There is normal right ventricular global systolic function.   5. There is no evidence of mitral valve stenosis.   6. Trace mitral valve regurgitation.   7. Trace tricuspid regurgitation is visualized.   8. Aortic valve stenosis is not present.   9. The pulmonary artery is not well visualized.    QUANTITATIVE DATA SUMMARY:     2D MEASUREMENTS:           Normal Ranges:  Ao Root d:       2.74 cm   (2.0-3.7cm)  LAs:             3.58 cm   (2.7-4.0cm)  IVSd:            1.13 cm   (0.6-1.1cm)  LVPWd:           1.00 cm   (0.6-1.1cm)  LVIDd:           3.71 cm   (3.9-5.9cm)  LVIDs:           2.36 cm  LV Mass Index:   64.8 g/m2  LV % FS          36.4 %       LA VOLUME:                    Normal Ranges:  LA Vol A4C:        37.9 ml    (22+/-6mL/m2)  LA Vol A2C:        31.4 ml  LA Vol BP:         35.2 ml  LA Vol Index A4C:  19.8ml/m2  LA Vol Index A2C:  16.4 ml/m2  LA Vol Index BP:   18.4 ml/m2  LA Area A4C:       16.3 cm2  LA Area A2C:       14.5 cm2  LA Major Axis A4C: 6.0 cm  LA Major Axis A2C: 5.7 cm  LA Volume Index:   17.6 ml/m2       RA VOLUME BY A/L METHOD:            Normal Ranges:  RA Vol A4C:              24.9 ml    (8.3-19.5ml)  RA Vol Index A4C:         13.0 ml/m2  RA Area A4C:             11.9 cm2  RA Major Axis A4C:       4.8 cm       AORTA MEASUREMENTS:         Normal Ranges:  Asc Ao, d:          3.36 cm (2.1-3.4cm)       LV SYSTOLIC FUNCTION BY 2D PLANIMETRY (MOD):                       Normal Ranges:  EF-A4C View:    67 % (>=55%)  EF-A2C View:    68 %  EF-Biplane:     68 %  EF-Visual:      60 %  LV EF Reported: 60 %       LV DIASTOLIC FUNCTION:           Normal Ranges:  MV Peak E:             1.10 m/s  (0.7-1.2 m/s)  MV Peak A:             1.01 m/s  (0.42-0.7 m/s)  E/A Ratio:             1.09      (1.0-2.2)  MV e'                  0.071 m/s (>8.0)  MV lateral e'          0.08 m/s  MV medial e'           0.06 m/s  E/e' Ratio:            15.56     (<8.0)       MITRAL VALVE:          Normal Ranges:  MV DT:        314 msec (150-240msec)       AORTIC VALVE:                      Normal Ranges:  AoV Vmax:                1.89 m/s  (<=1.7m/s)  AoV Peak P.3 mmHg (<20mmHg)  AoV Mean P.0 mmHg  (1.7-11.5mmHg)  LVOT Max Bakari:            1.06 m/s  (<=1.1m/s)  AoV VTI:                 39.50 cm  (18-25cm)  LVOT VTI:                23.00 cm  LVOT Diameter:           1.78 cm   (1.8-2.4cm)  AoV Area, VTI:           1.45 cm2  (2.5-5.5cm2)  AoV Area,Vmax:           1.40 cm2  (2.5-4.5cm2)  AoV Dimensionless Index: 0.58       RIGHT VENTRICLE:  RV Basal 3.19 cm  RV Mid   2.94 cm  RV Major 6.8 cm  TAPSE:   21.1 mm  RV s'    0.14 m/s       TRICUSPID VALVE/RVSP:         Normal Ranges:  IVC Diam:             1.64 cm       PULMONIC VALVE:          Normal Ranges:  PV Accel Time:  132 msec (>120ms)  PV Max Bakari:     0.9 m/s  (0.6-0.9m/s)  PV Max PG:      3.3 mmHg       [id number] Delmar Chapin DO  Electronically signed on 10/10/2024 at 12:01:37 PM       Wall Scoring       ** Final **  ECG 12 Lead  Sinus bradycardia  Right bundle branch block  Possible Inferior infarct (cited on or before 10-OCT-2024)  Abnormal ECG  When compared with ECG of 10-OCT-2024 09:21,  (unconfirmed)  No significant change was found      Physical Exam    General: Well-developed obese female, has mild respiratory distress, on room air  HEENT: AT, NC, no JVD, no lymphadenopathy, neck supple  Lungs: Bilateral coarse breath sound noted  Cardiac: Normal S1-S2, no murmur, no gallop  Abdomen: Soft, nontender, no distention, positive bowel sound  Extremities: No deformity, no edema, pulses intact, ROM intact  Neurological: Alert awake oriented x3, sensation intact, clear speech        Assessment & Plan  Chest pain      Vannessa Soto is a 54 y.o. female from home with a past medical history of COPD, DM 2, HLD, HTN, active smoker, obesity, who was admitted for management of chest pain to rule out ACS     Continue telemetry monitor, pain management, antiemetic as needed  Continue aspirin, Lipitor, nitroglycerin  Oxygen therapy as needed  Elevated , A1c 7.8, BNP normal, TSH normal  Tropes negative x 3  Echo done and reviewed: Estimated LVEF 60%, trace TR  Cardiology consulted, plan for stress test tomorrow  ISS, hypoglycemia protocol, POC glucose, DM diet  Continue with home meds for other comorbidities  Nicotine patch daily  Pt needs outpatient fu with pulm for PFT and sleep study   VTE prophylaxis: Lovenox subcu  Disposition: Home once hemodynamically stable  Pt might need home O2 eval on discharge       Nunu Underwood MD

## 2024-10-10 NOTE — PROGRESS NOTES
10/10/24 1601   Discharge Planning   Living Arrangements Other (Comment);Children;Family members  (son, PARESH and 2yo granddaughter live w/ her.)   Support Systems Family members;Children   Assistance Needed independent in adls and shares iadls w/ son, PARESH. drives. employed [employer] works 9;45 pm to 6:15 am. has no home o2. cannot afford jardiance.   Type of Residence Private residence  (bathrooms on 2nd and 3rd floor)   Number of Stairs to Enter Residence 3  (2 hrs)   Do you have animals or pets at home? Yes   Type of Animals or Pets dog- shweta   Home or Post Acute Services Community services;Other (Comment)  (healthy at home-given info)   Type of Home Care Services DME or oxygen  (may need home o2 evaluation)     Pt confirms demo's. Works full-time. Will discharge home and prefers to return to work. On o2 currently. To have home o2 eval . Pt  changed insurances as of 10/1 ( no longer qualified for [facility]) has 2 meds no longer covered and [facility] does not take her new ins. Pt reports provider Maria Dolores Weeks no longer is working at [employer] either. Pt given  provider list. Discussed  healthy at home program. Pt agrees. 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 Asked that RN navigator to be consulted by BHUPENDRA Jones. Able to get her 14 day supply and 10 copay card. [facility] may assist with pcp appt. And medications beyond current coverage. Pt has approx a 2 month supply at home, ( Jardiance)

## 2024-10-11 ENCOUNTER — APPOINTMENT (OUTPATIENT)
Dept: RADIOLOGY | Facility: HOSPITAL | Age: 54
End: 2024-10-11
Payer: COMMERCIAL

## 2024-10-11 ENCOUNTER — APPOINTMENT (OUTPATIENT)
Dept: CARDIOLOGY | Facility: HOSPITAL | Age: 54
End: 2024-10-11
Payer: COMMERCIAL

## 2024-10-11 VITALS
WEIGHT: 207.01 LBS | DIASTOLIC BLOOD PRESSURE: 56 MMHG | SYSTOLIC BLOOD PRESSURE: 110 MMHG | OXYGEN SATURATION: 93 % | HEIGHT: 62 IN | BODY MASS INDEX: 38.09 KG/M2 | RESPIRATION RATE: 16 BRPM | TEMPERATURE: 98.1 F | HEART RATE: 57 BPM

## 2024-10-11 LAB
ANION GAP SERPL CALC-SCNC: 10 MMOL/L (ref 10–20)
ATRIAL RATE: 57 BPM
ATRIAL RATE: 58 BPM
BUN SERPL-MCNC: 22 MG/DL (ref 6–23)
CALCIUM SERPL-MCNC: 9.3 MG/DL (ref 8.6–10.3)
CHLORIDE SERPL-SCNC: 106 MMOL/L (ref 98–107)
CO2 SERPL-SCNC: 27 MMOL/L (ref 21–32)
CREAT SERPL-MCNC: 0.72 MG/DL (ref 0.5–1.05)
EGFRCR SERPLBLD CKD-EPI 2021: >90 ML/MIN/1.73M*2
ERYTHROCYTE [DISTWIDTH] IN BLOOD BY AUTOMATED COUNT: 12.6 % (ref 11.5–14.5)
GLUCOSE BLD MANUAL STRIP-MCNC: 144 MG/DL (ref 74–99)
GLUCOSE BLD MANUAL STRIP-MCNC: 149 MG/DL (ref 74–99)
GLUCOSE SERPL-MCNC: 182 MG/DL (ref 74–99)
HCT VFR BLD AUTO: 45.5 % (ref 36–46)
HGB BLD-MCNC: 15 G/DL (ref 12–16)
HOLD SPECIMEN: NORMAL
MCH RBC QN AUTO: 29.8 PG (ref 26–34)
MCHC RBC AUTO-ENTMCNC: 33 G/DL (ref 32–36)
MCV RBC AUTO: 91 FL (ref 80–100)
NRBC BLD-RTO: 0 /100 WBCS (ref 0–0)
P AXIS: 0 DEGREES
P AXIS: 5 DEGREES
P OFFSET: 172 MS
P OFFSET: 195 MS
P ONSET: 122 MS
P ONSET: 142 MS
PLATELET # BLD AUTO: 201 X10*3/UL (ref 150–450)
POTASSIUM SERPL-SCNC: 4 MMOL/L (ref 3.5–5.3)
PR INTERVAL: 154 MS
PR INTERVAL: 156 MS
Q ONSET: 200 MS
Q ONSET: 219 MS
QRS COUNT: 10 BEATS
QRS COUNT: 9 BEATS
QRS DURATION: 138 MS
QRS DURATION: 140 MS
QT INTERVAL: 430 MS
QT INTERVAL: 450 MS
QTC CALCULATION(BAZETT): 422 MS
QTC CALCULATION(BAZETT): 438 MS
QTC FREDERICIA: 424 MS
QTC FREDERICIA: 442 MS
R AXIS: 7 DEGREES
R AXIS: 8 DEGREES
RBC # BLD AUTO: 5.03 X10*6/UL (ref 4–5.2)
SODIUM SERPL-SCNC: 139 MMOL/L (ref 136–145)
T AXIS: 10 DEGREES
T AXIS: 14 DEGREES
T OFFSET: 425 MS
T OFFSET: 434 MS
VENTRICULAR RATE: 57 BPM
VENTRICULAR RATE: 58 BPM
WBC # BLD AUTO: 8.1 X10*3/UL (ref 4.4–11.3)

## 2024-10-11 PROCEDURE — 2500000001 HC RX 250 WO HCPCS SELF ADMINISTERED DRUGS (ALT 637 FOR MEDICARE OP): Performed by: STUDENT IN AN ORGANIZED HEALTH CARE EDUCATION/TRAINING PROGRAM

## 2024-10-11 PROCEDURE — 93017 CV STRESS TEST TRACING ONLY: CPT

## 2024-10-11 PROCEDURE — 2500000002 HC RX 250 W HCPCS SELF ADMINISTERED DRUGS (ALT 637 FOR MEDICARE OP, ALT 636 FOR OP/ED): Performed by: STUDENT IN AN ORGANIZED HEALTH CARE EDUCATION/TRAINING PROGRAM

## 2024-10-11 PROCEDURE — 80048 BASIC METABOLIC PNL TOTAL CA: CPT | Performed by: STUDENT IN AN ORGANIZED HEALTH CARE EDUCATION/TRAINING PROGRAM

## 2024-10-11 PROCEDURE — 2500000004 HC RX 250 GENERAL PHARMACY W/ HCPCS (ALT 636 FOR OP/ED): Performed by: NURSE PRACTITIONER

## 2024-10-11 PROCEDURE — 78452 HT MUSCLE IMAGE SPECT MULT: CPT

## 2024-10-11 PROCEDURE — 93010 ELECTROCARDIOGRAM REPORT: CPT | Performed by: INTERNAL MEDICINE

## 2024-10-11 PROCEDURE — A9502 TC99M TETROFOSMIN: HCPCS | Performed by: STUDENT IN AN ORGANIZED HEALTH CARE EDUCATION/TRAINING PROGRAM

## 2024-10-11 PROCEDURE — 93005 ELECTROCARDIOGRAM TRACING: CPT

## 2024-10-11 PROCEDURE — 82947 ASSAY GLUCOSE BLOOD QUANT: CPT

## 2024-10-11 PROCEDURE — 85027 COMPLETE CBC AUTOMATED: CPT | Performed by: STUDENT IN AN ORGANIZED HEALTH CARE EDUCATION/TRAINING PROGRAM

## 2024-10-11 PROCEDURE — S4991 NICOTINE PATCH NONLEGEND: HCPCS | Performed by: STUDENT IN AN ORGANIZED HEALTH CARE EDUCATION/TRAINING PROGRAM

## 2024-10-11 PROCEDURE — 99239 HOSP IP/OBS DSCHRG MGMT >30: CPT | Performed by: STUDENT IN AN ORGANIZED HEALTH CARE EDUCATION/TRAINING PROGRAM

## 2024-10-11 PROCEDURE — 3430000001 HC RX 343 DIAGNOSTIC RADIOPHARMACEUTICALS: Performed by: STUDENT IN AN ORGANIZED HEALTH CARE EDUCATION/TRAINING PROGRAM

## 2024-10-11 PROCEDURE — 78452 HT MUSCLE IMAGE SPECT MULT: CPT | Performed by: INTERNAL MEDICINE

## 2024-10-11 PROCEDURE — 36415 COLL VENOUS BLD VENIPUNCTURE: CPT | Performed by: STUDENT IN AN ORGANIZED HEALTH CARE EDUCATION/TRAINING PROGRAM

## 2024-10-11 PROCEDURE — 99232 SBSQ HOSP IP/OBS MODERATE 35: CPT | Performed by: INTERNAL MEDICINE

## 2024-10-11 PROCEDURE — 2500000001 HC RX 250 WO HCPCS SELF ADMINISTERED DRUGS (ALT 637 FOR MEDICARE OP): Performed by: NURSE PRACTITIONER

## 2024-10-11 RX ORDER — ASPIRIN 81 MG/1
81 TABLET ORAL DAILY
Qty: 30 TABLET | Refills: 0 | Status: SHIPPED | OUTPATIENT
Start: 2024-10-12 | End: 2024-11-11

## 2024-10-11 RX ORDER — IBUPROFEN 200 MG
1 TABLET ORAL DAILY
Qty: 56 PATCH | Refills: 0 | Status: SHIPPED | OUTPATIENT
Start: 2024-10-12 | End: 2024-12-07

## 2024-10-11 RX ORDER — ATORVASTATIN CALCIUM 40 MG/1
40 TABLET, FILM COATED ORAL DAILY
Qty: 30 TABLET | Refills: 1 | Status: SHIPPED | OUTPATIENT
Start: 2024-10-11 | End: 2024-12-10

## 2024-10-11 RX ORDER — METFORMIN HYDROCHLORIDE 1000 MG/1
1000 TABLET ORAL
Qty: 60 TABLET | Refills: 1 | Status: SHIPPED | OUTPATIENT
Start: 2024-10-11 | End: 2024-10-16 | Stop reason: SINTOL

## 2024-10-11 RX ORDER — REGADENOSON 0.08 MG/ML
0.4 INJECTION, SOLUTION INTRAVENOUS
Status: COMPLETED | OUTPATIENT
Start: 2024-10-11 | End: 2024-10-11

## 2024-10-11 NOTE — DISCHARGE SUMMARY
Discharge Diagnosis  Chest pain, noncardiac likely in the setting of lung disease with history of active smoking and questionable FAUSTO  Active smoker    Issues Requiring Follow-Up  Outpatient follow-up with pulmonology office for PFT and sleep study to rule out FAUSTO and for COPD    Discharge Meds     Medication List      START taking these medications     aspirin 81 mg EC tablet; Take 1 tablet (81 mg) by mouth once daily.;   Start taking on: October 12, 2024   atorvastatin 40 mg tablet; Commonly known as: Lipitor; Take 1 tablet (40   mg) by mouth once daily.   montelukast 10 mg tablet; Commonly known as: Singulair; Take 1 tablet   (10 mg) by mouth once daily at bedtime.   nicotine 21 mg/24 hr patch; Commonly known as: Nicoderm CQ; Place 1   patch over 24 hours on the skin once daily.; Start taking on: October 12, 2024     CHANGE how you take these medications     albuterol 90 mcg/actuation inhaler; Inhale 1 puff 3 times a day.; What   changed: when to take this, reasons to take this   ibuprofen 200 mg tablet; What changed: Another medication with the same   name was removed. Continue taking this medication, and follow the   directions you see here.   pantoprazole 40 mg EC tablet; Commonly known as: ProtoNix; TAKE 1 TABLET   BY MOUTH TWICE DAILY; What changed: when to take this     CONTINUE taking these medications     Combivent Respimat  mcg/actuation inhaler; Generic drug:   ipratropium-albuteroL; Inhale 1 puff 4 times a day.   Jardiance 25 mg; Generic drug: empagliflozin; Take 1 tablet (25 mg) by   mouth once daily.   lisinopril 5 mg tablet; Take 1 tablet (5 mg) by mouth once daily.   metoclopramide 10 mg tablet; Commonly known as: Reglan   Victoza 3-Stephen 0.6 mg/0.1 mL (18 mg/3 mL) injection; Generic drug:   liraglutide     STOP taking these medications     diclofenac sodium 1 % gel; Commonly known as: Voltaren   fluticasone 50 mcg/actuation nasal spray; Commonly known as: Flonase   glipiZIDE 5 mg tablet;  Commonly known as: Glucotrol   hydroCHLOROthiazide 12.5 mg capsule; Commonly known as: Microzide   hydrOXYzine pamoate 25 mg capsule; Commonly known as: Vistaril       Test Results Pending At Discharge  Pending Labs       No current pending labs.            Hospital Course  Vannessa Soto is a 54 y.o. female from home with a past medical history of COPD, DM 2, HLD, HTN, active smoker, obesity, who was admitted for management of chest pain to rule out ACS     Patient was placed on telemetry monitor, pain management, antiemetic as needed, aspirin, Lipitor, nitroglycerin, Oxygen therapy as needed  Elevated , A1c 7.8, BNP normal, TSH normal, will be discharged on statin.  Tropes negative x 3.   Echo done and reviewed: Estimated LVEF 60%, trace TR  Cardiology consulted, status post stress test with normal results.  ISS, hypoglycemia protocol, POC glucose, DM diet, was considered.  Continued with home meds for other comorbidities.   Nicotine patch daily was considered.  Pt needs outpatient fu with pulm for PFT and sleep study.   VTE prophylaxis: Was with Lovenox subcu      Currently patient is hemodynamically stable and ready for discharge home.  She was be evaluated by respiratory therapy for possible oxygen at home and at she did not qualify.  Patient was started on metformin on discharge.  She needs outpatient follow-up with pulmonology for PFT and sleep study to rule out FAUSTO.  Patient will be continued on a statin, aspirin, montelukast, nicotine patch, inhalers, and rest of her home medication.  She will also follow-up with her primary care as needed.  She will also continue Jardiance 25 mg daily on discharge.      Pertinent Physical Exam At Time of Discharge  Physical Exam    General: Well-developed obese female, mild respiratory distress with exertion, on room air  HEENT: AT, NC, no JVD, no lymphadenopathy, neck supple  Lungs: No wheezing, no crackles  Cardiac: Normal S1-S2, no murmur, no gallop  Abdomen:  Soft, nontender, no distention, positive bowel sound  Extremities: No deformity, no edema, pulses intact, ROM intact  Neurological: Alert awake oriented x3, sensation intact, clear speech    Outpatient Follow-Up  Future Appointments   Date Time Provider Department Center   10/31/2024  9:00 AM CHAVO [facility] [id number]       Time spent 40 minutes  Nunu Underwood MD

## 2024-10-11 NOTE — PROGRESS NOTES
Home Oxygen Evaluation        Date/Time SpO2 Medical Gas Therapy Medical Gas Delivery Method Oxygen L/min Patient is on During the Study Patient Activity During Study    10/11/24 1300 98 %  None (Room air)  --  -- --     10/11/24 1353 93 %  None (Room air)  --  -- --                                     Recommendations:  WAS PATIENT TESTED FOR HOME OXYGEN NEED? YES  DID PATIENT QUALIFY FOR HOME OXYGEN? NO.  PATIENT ONLY DESAT TO 93% ON ROOM AIR WHILE AMBULATING. 98% AT REST.

## 2024-10-11 NOTE — PROGRESS NOTES
[facility] Heart Progress Note           Rounding LIZY/Cardiologist:  Mukesh Munoz, APRN-EVE, Dr. Neil Lundberg   Primary Cardiologist: Dr. Neil Lundberg     Date:  10/11/2024  Patient:  Vannessa Soto  YOB: 1970  MRN:  [id number]   Admit Date:  10/9/2024      SUBJECTIVE:    10/11/2024  Patient is awake alert and oriented x 3.  She states that yesterday when she was walking to the bathroom she did have an episode of chest pain on her right side.  Her chest pain is reproducible on the right side at times.  Telemetry is normal sinus rhythm with occasional PVC      VITALS:     Vitals:    10/10/24 1536 10/10/24 2355 10/11/24 0352 10/11/24 0718   BP: 104/51 (!) 99/43 104/59 110/56   BP Location:   Right arm    Patient Position:   Lying Lying   Pulse: 59 59 57 57   Resp: 16 16 14 16   Temp: 36.6 °C (97.9 °F) 36.1 °C (97 °F) 36.4 °C (97.5 °F) 36.7 °C (98.1 °F)   TempSrc:   Temporal    SpO2: 97% 99% 97% 97%   Weight:       Height:         No intake or output data in the 24 hours ending 10/11/24 0743    Wt Readings from Last 4 Encounters:   10/09/24 93.9 kg (207 lb 0.2 oz)   09/24/24 91.6 kg (202 lb)   08/21/24 95.3 kg (210 lb)   06/03/24 92.1 kg (203 lb)       CURRENT HOSPITAL MEDICATIONS:   albuterol, 1 puff, inhalation, TID  aspirin, 81 mg, oral, Daily  atorvastatin, 40 mg, oral, Nightly  empagliflozin, 25 mg, oral, Daily  enoxaparin, 40 mg, subcutaneous, q24h  hydroCHLOROthiazide, 12.5 mg, oral, Daily  insulin lispro, 0-10 Units, subcutaneous, TID  lisinopril, 5 mg, oral, Daily  montelukast, 10 mg, oral, Nightly  nicotine, 1 patch, transdermal, Daily  pantoprazole, 40 mg, oral, BID  perflutren protein A microsphere, 0.5 mL, intravenous, Once in imaging         Current Outpatient Medications   Medication Instructions    albuterol 90 mcg/actuation inhaler 1 puff, inhalation, 3 times daily RT    atorvastatin (LIPITOR) 40 mg, oral, Daily    Combivent Respimat  mcg/actuation inhaler 1 puff,  inhalation, 4 times daily RT    diclofenac sodium (Voltaren) 1 % gel gel 1 Application, Topical, Daily, Apply to the affected area    fluticasone (Flonase) 50 mcg/actuation nasal spray 2 sprays, Each Nostril, Daily, Shake gently. Before first use, prime pump. After use, clean tip and replace cap.    glipiZIDE (GLUCOTROL) 5 mg, oral, 3 times daily    hydroCHLOROthiazide (MICROZIDE) 12.5 mg, oral, Daily    hydrOXYzine pamoate (VISTARIL) 25 mg, oral, 3 times daily PRN    ibuprofen 600 mg, oral, Every 6 hours PRN    ibuprofen 200 mg, oral, Every 6 hours PRN    Jardiance 25 mg, oral, Daily    lisinopril 5 mg, oral, Daily    metoclopramide (REGLAN) 10 mg, oral, Daily PRN    montelukast (SINGULAIR) 10 mg, oral, Nightly    pantoprazole (ProtoNix) 40 mg EC tablet TAKE 1 TABLET BY MOUTH TWICE DAILY    Victoza 3-Stephen 1.8 mg, subcutaneous, Daily        PHYSICAL EXAMINATION:   GENERAL:  Well developed, well nourished, in no acute distress.  CHEST:  Symmetric and tender right side  NEURO/PSYCH:  Alert and oriented times three with approppriate behavior and responses.  NECK:  Supple, no JVD, no bruit.  LUNGS: Diminished with an expiratory wheeze  HEART:  Rate and rhythm regular with no evident murmur, no gallop appreciated.        There are no rubs, clicks or heaves.  EXTREMITIES:  Warm with good color, no clubbing or cyanosis.  There is no edema noted.  PERIPHERAL VASCULAR:  Pulses present and equally palpable; 1+ throughout.      LAB DATA:     CBC:   Results from last 7 days   Lab Units 10/11/24  0541 10/10/24  0532 10/09/24  1029   WBC AUTO x10*3/uL 8.1 6.5 6.9   RBC AUTO x10*6/uL 5.03 4.94 5.32*   HEMOGLOBIN g/dL 15.0 14.7 16.0   HEMATOCRIT % 45.5 45.1 47.5*   MCV fL 91 91 89   MCH pg 29.8 29.8 30.1   MCHC g/dL 33.0 32.6 33.7   RDW % 12.6 12.7 12.7   PLATELETS AUTO x10*3/uL 201 205 237     CMP:    Results from last 7 days   Lab Units 10/11/24  0541 10/10/24  0532 10/09/24  1034   SODIUM mmol/L 139 139 139   POTASSIUM mmol/L 4.0  4.4 4.4   CHLORIDE mmol/L 106 105 107   CO2 mmol/L 27 29 26   BUN mg/dL 22 19 14   CREATININE mg/dL 0.72 0.83 0.73   GLUCOSE mg/dL 182* 200* 159*   PROTEIN TOTAL g/dL  --   --  6.3*   CALCIUM mg/dL 9.3 9.6 9.9   BILIRUBIN TOTAL mg/dL  --   --  0.5   ALK PHOS U/L  --   --  88   AST U/L  --   --  13   ALT U/L  --   --  17     BMP:    Results from last 7 days   Lab Units 10/11/24  0541 10/10/24  0532 10/09/24  1034   SODIUM mmol/L 139 139 139   POTASSIUM mmol/L 4.0 4.4 4.4   CHLORIDE mmol/L 106 105 107   CO2 mmol/L 27 29 26   BUN mg/dL 22 19 14   CREATININE mg/dL 0.72 0.83 0.73   CALCIUM mg/dL 9.3 9.6 9.9   GLUCOSE mg/dL 182* 200* 159*     Magnesium:  Results from last 7 days   Lab Units 10/10/24  0532   MAGNESIUM mg/dL 1.92     Troponin:    Results from last 7 days   Lab Units 10/10/24  0532 10/09/24  1227 10/09/24  1034   TROPHS ng/L 3 3 4     BNP:   Results from last 7 days   Lab Units 10/09/24  1029   BNP pg/mL 40     Lipid Panel:  Results from last 7 days   Lab Units 10/09/24  1227   HDL mg/dL 35.0   CHOLESTEROL/HDL RATIO  5.5   VLDL mg/dL 28   TRIGLYCERIDES mg/dL 142   NON HDL CHOL. mg/dL 158*        DIAGNOSTIC TESTING:   @No results found for this or any previous visit.    Transthoracic Echo (TTE) Complete  CONCLUSIONS:   1. The left ventricle was not well visualized. The left ventricular ejection fraction could not be measured.   2. The left ventricular systolic function is normal, with a visually estimated ejection fraction of 60%.   3. No regional wall motion abnormalities.   4. There is normal right ventricular global systolic function.   5. There is no evidence of mitral valve stenosis.   6. Trace mitral valve regurgitation.   7. Trace tricuspid regurgitation is visualized.   8. Aortic valve stenosis is not present.   9. The pulmonary artery is not well visualized.       ECG 12 Lead    Result Date: 10/10/2024  Sinus bradycardia Right bundle branch block Possible Inferior infarct (cited on or before  10-OCT-2024) Abnormal ECG When compared with ECG of 10-OCT-2024 09:21, (unconfirmed) No significant change was found    ECG 12 lead    Result Date: 10/9/2024  Normal sinus rhythm Right bundle branch block Abnormal ECG When compared with ECG of 09-OCT-2024 10:10, Sinus rhythm has replaced Ectopic atrial rhythm See ED provider note for full interpretation and clinical correlation Confirmed by Marylou Romero ([id number]) on 10/9/2024 12:01:22 PM    ECG 12 lead    Result Date: 10/9/2024  Unusual P axis, possible ectopic atrial rhythm Right bundle branch block Abnormal ECG When compared with ECG of 21-AUG-2024 18:31, Premature ventricular complexes are no longer Present See ED provider note for full interpretation and clinical correlation Confirmed by Marylou Romero ([id number]) on 10/9/2024 11:09:43 AM    XR chest 1 view    Result Date: 10/9/2024  Interpreted By:  Norbert Aguirre, STUDY: XR CHEST 1 VIEW;  10/9/2024 10:42 am   INDICATION: Signs/Symptoms:Chest Pain.   COMPARISON: CT abdomen pelvis dated 08/21/2024.   ACCESSION NUMBER(S): [id number]   ORDERING CLINICIAN: EVIE CASTANEDA   TECHNIQUE: Single AP portable view of the chest was obtained.   FINDINGS: MEDIASTINUM/ LUNGS/ MANUEL: No cardiomegaly, vascular congestion, or pleural effusion. No abnormal opacity in either lung worrisome for tumor or pneumonia. No pneumothorax. No tracheal deviation. No abnormal hilar fullness or gross mass on either side.   BONES: No lytic or blastic destructive bone lesion.  There is mild-to-moderate disc space narrowing and endplate osteophytosis throughout the thoracic spine.   UPPER ABDOMEN: Grossly intact.       Thoracic spine DJD as described. Otherwise, negative exam.   MACRO: None   Signed by: Norbert Aguirre 10/9/2024 10:44 AM Dictation workstation:   [id number]       Transthoracic Echo (TTE) Complete   Final Result      XR chest 1 view   Final Result   Thoracic spine DJD as described. Otherwise, negative exam.        MACRO:    None        Signed by: Norbert Aguirre 10/9/2024 10:44 AM   Dictation workstation:   [id number]      Nuclear Stress Test    (Results Pending)           RADIOLOGY:     Transthoracic Echo (TTE) Complete   Final Result      XR chest 1 view   Final Result   Thoracic spine DJD as described. Otherwise, negative exam.        MACRO:   None        Signed by: Norbert Aguirre 10/9/2024 10:44 AM   Dictation workstation:   [id number]      Nuclear Stress Test    (Results Pending)       PROBLEM LIST     Patient Active Problem List   Diagnosis    Diabetes 1.5, managed as type 2 (Multi)    Elevated lipoprotein(a)    Obesity    Other emphysema (Multi)    Osteoarthritis of shoulder    Asthma    Anxiety    Atypical chest pain    Bilateral carpal tunnel syndrome    Cholelithiases    Current mild episode of major depressive disorder without prior episode (CMS-HCC)    Cyst of skin    Osteoarthritis    Degenerative joint disease involving multiple joints    Diabetes mellitus type 2 in obese    Type 2 diabetes mellitus with hyperosmolarity without coma, without long-term current use of insulin (Multi)    Epigastric pain    Gastroesophageal reflux disease without esophagitis    Hepatomegaly    Left shoulder pain    Mild intermittent asthma without complication ([facility]-H[facility])    Neuropathy    Nicotine dependence in remission    Primary hypertension    Shoulder arthritis    Simple chronic bronchitis (Multi)    Hypercholesterolemia    Hyperlipidemia    Obesity, Class III, BMI 40-49.9 (morbid obesity) (Multi)    Class 2 obesity with body mass index (BMI) of 37.0 to 37.9 in adult    Scalp cyst    Chest pain    Angina of effort (CMS-HCC)       ASSESSMENT:   Atypical chest pain  Hypertension  Diabetes  Dyslipidemia  COPD  EF 60%        PLAN:   54-year-old woman without known coronary disease but risk factors including hypertension diabetes hyperlipidemia and tobacco abuse which is a half a pack of cigarettes a day for over 30 years.  She presents  primarily because of chest discomfort her primary description is that it hurts when she turns a certain way takes a deep breath coughs pushes on her chest wall on the right side.  However she also notes some discomfort in the same area if she walks for a fast.  This may or may not be the same discomfort as she gets when she pushes on her chest.  This has been present for some time.     Exam: Obese, comfortable lungs are clear slightly deep breast breath sounds cardiac exam shows a regular rhythm and rate no murmur gallop appreciated abdominal exam is soft extremities show no edema pulses are 2+.     Troponins are normal  ECG shows right bundle branch block sinus rhythm no change from prior studies     Impression  Patient with chest pain of 1 aspect is clearly musculoskeletal.  She has exertional symptoms which could also be musculoskeletal as though it is in the same area but I am not convinced all of that exertional discomfort is the same as her clear-cut costochondritis.  She has completely normal troponins despite prolonged pain there is no evidence of infarction or ischemia on ECG or by enzymes.  Given her high risk would pursue stress perfusion could switch to Lexiscan if necessary.  Echo shows no wall motion abnormalities or valvular heart disease.  She needs to stop smoking cigarettes.  Will optimize treatment of blood pressure and lipids.  Probably discharge tomorrow after stress test    10/11/2024  Tele monitoring  EKG now please  Aspirin 81 mg daily  Lipitor 40 mg daily  Lisinopril 5 mg daily  HCTZ 12.5 mg daily  No beta-blocker at this present time due to heart rate 57  Lexiscan nuclear stress test this morning well-informed risk versus benefits verbalized understanding would like to proceed with procedure  Spoke at length with patient regarding stop smoking  further recommendations per Dr. Tamika Munoz [facility]    CARDIOLOGIST NOTE  I  have personally seen and examined patient as well as personally formulated treatment plan.  I have reviewed this note as created by CHAVO Jo CNP and agree with his findings and physical exam.    Currently feels well.  Myocardial perfusion imaging shows no ischemia and normal LV systolic function.  Occasionally with some vague chest aching no symptoms to suggest angina    Exam: Lungs are clear cardiac exam is a regular rhythm and rate    Impression  Admitting chest pain noncardiac related to COPD  At risk of coronary disease discussed the importance of close follow-up with primary care to assure control of lipids and blood pressure  Reviewed the need to stop smoking cigarettes  No further inpatient cardiac evaluation indicated aggressive risk factor reduction should be pursued by primary care  I reviewed with her that a stress test showing no ischemia does not mean there is no coronary disease simply that there is unlikely to be severe coronary disease.  Should her symptoms change or she feel worse she would return to the hospital.  Current time overall risk of event in the near future is low    Neil Lundberg MD        Of note, this documentation is completed using the Dragon Dictation system (voice recognition software). There may be spelling and/or grammatical errors that were not corrected prior to final submission.    Please do not hesitate to call with questions.  Electronically signed by CHAVO Enrique-CNP, on 10/11/2024 at 7:43 AM

## 2024-10-11 NOTE — CARE PLAN
The patient's goals for the shift include      The clinical goals for the shift include pt pain level will be <4/10 by 0300 10/11/24      Problem: Diabetes  Goal: Achieve decreasing blood glucose levels by end of shift  Outcome: Progressing  Goal: Increase stability of blood glucose readings by end of shift  Outcome: Progressing  Goal: Decrease in ketones present in urine by end of shift  Outcome: Progressing  Goal: Maintain electrolyte levels within acceptable range throughout shift  Outcome: Progressing  Goal: Maintain glucose levels >70mg/dl to <250mg/dl throughout shift  Outcome: Progressing  Goal: No changes in neurological exam by end of shift  Outcome: Progressing  Goal: Learn about and adhere to nutrition recommendations by end of shift  Outcome: Progressing  Goal: Vital signs within normal range for age by end of shift  Outcome: Progressing  Goal: Increase self care and/or family involovement by end of shift  Outcome: Progressing  Goal: Receive DSME education by end of shift  Outcome: Progressing     Problem: Pain - Adult  Goal: Verbalizes/displays adequate comfort level or baseline comfort level  Outcome: Progressing     Problem: Safety - Adult  Goal: Free from fall injury  Outcome: Progressing     Problem: Discharge Planning  Goal: Discharge to home or other facility with appropriate resources  Outcome: Progressing     Problem: Chronic Conditions and Co-morbidities  Goal: Patient's chronic conditions and co-morbidity symptoms are monitored and maintained or improved  Outcome: Progressing     Problem: Pain  Goal: Takes deep breaths with improved pain control throughout the shift  Outcome: Progressing  Goal: Turns in bed with improved pain control throughout the shift  Outcome: Progressing  Goal: Walks with improved pain control throughout the shift  Outcome: Progressing  Goal: Performs ADL's with improved pain control throughout shift  Outcome: Progressing  Goal: Participates in PT with improved pain  control throughout the shift  Outcome: Progressing  Goal: Free from opioid side effects throughout the shift  Outcome: Progressing  Goal: Free from acute confusion related to pain meds throughout the shift  Outcome: Progressing

## 2024-10-14 ENCOUNTER — PATIENT OUTREACH (OUTPATIENT)
Dept: HOME HEALTH SERVICES | Age: 54
End: 2024-10-14

## 2024-10-14 ENCOUNTER — PATIENT OUTREACH (OUTPATIENT)
Dept: CARDIOLOGY | Facility: CLINIC | Age: 54
End: 2024-10-14

## 2024-10-14 RX ORDER — MONTELUKAST SODIUM 10 MG/1
10 TABLET ORAL NIGHTLY
Qty: 30 TABLET | Refills: 1 | Status: SHIPPED | OUTPATIENT
Start: 2024-10-14 | End: 2024-12-13

## 2024-10-14 SDOH — HEALTH STABILITY: PHYSICAL HEALTH
HOW OFTEN DO YOU NEED TO HAVE SOMEONE HELP YOU WHEN YOU READ INSTRUCTIONS, PAMPHLETS, OR OTHER WRITTEN MATERIAL FROM YOUR DOCTOR OR PHARMACY?: NEVER

## 2024-10-14 SDOH — HEALTH STABILITY: PHYSICAL HEALTH: ON AVERAGE, HOW MANY MINUTES DO YOU ENGAGE IN EXERCISE AT THIS LEVEL?: 0 MIN

## 2024-10-14 SDOH — SOCIAL STABILITY: SOCIAL NETWORK: HOW OFTEN DO YOU ATTEND CHURCH OR RELIGIOUS SERVICES?: NEVER

## 2024-10-14 SDOH — SOCIAL STABILITY: SOCIAL NETWORK
DO YOU BELONG TO ANY CLUBS OR ORGANIZATIONS SUCH AS CHURCH GROUPS, UNIONS, FRATERNAL OR ATHLETIC GROUPS, OR SCHOOL GROUPS?: NO

## 2024-10-14 SDOH — SOCIAL STABILITY: SOCIAL NETWORK: HOW OFTEN DO YOU ATTEND MEETINGS OF THE CLUBS OR ORGANIZATIONS YOU BELONG TO?: NEVER

## 2024-10-14 SDOH — SOCIAL STABILITY: SOCIAL INSECURITY: ARE YOU MARRIED, WIDOWED, DIVORCED, SEPARATED, NEVER MARRIED, OR LIVING WITH A PARTNER?: DIVORCED

## 2024-10-14 SDOH — HEALTH STABILITY: MENTAL HEALTH
DO YOU FEEL STRESS - TENSE, RESTLESS, NERVOUS, OR ANXIOUS, OR UNABLE TO SLEEP AT NIGHT BECAUSE YOUR MIND IS TROUBLED ALL THE TIME - THESE DAYS?: NOT AT ALL

## 2024-10-14 SDOH — HEALTH STABILITY: PHYSICAL HEALTH: ON AVERAGE, HOW MANY DAYS PER WEEK DO YOU ENGAGE IN MODERATE TO STRENUOUS EXERCISE (LIKE A BRISK WALK)?: 0 DAYS

## 2024-10-14 SDOH — SOCIAL STABILITY: SOCIAL NETWORK: HOW OFTEN DO YOU GET TOGETHER WITH FRIENDS OR RELATIVES?: MORE THAN THREE TIMES A WEEK

## 2024-10-14 SDOH — SOCIAL STABILITY: SOCIAL NETWORK
IN A TYPICAL WEEK, HOW MANY TIMES DO YOU TALK ON THE PHONE WITH FAMILY, FRIENDS, OR NEIGHBORS?: MORE THAN THREE TIMES A WEEK

## 2024-10-14 NOTE — PROGRESS NOTES
Enrollment call  Patient agreeable to [facility] ([facility]). Enrollment call completed and program overview explained to patient, with appropriate numbers/info given.   Initial visit is 10/16 @ 10A VIDEO.       54 y.o. female from home with a past medical history of COPD, DM 2, HLD, HTN, active smoker, obesity       Discharge Diagnosis  Chest pain, noncardiac likely in the setting of lung disease with history of active smoking and questionable FAUSTO  Active smoker     Issues Requiring Follow-Up  Outpatient follow-up 1 week  Dr Alyson Spain pulmonology office for PFT and sleep study to rule out FAUSTO and for COPD    Dr Neil Lundberg Cardiology -CP Normal stress test    Needs New PCP

## 2024-10-14 NOTE — PROGRESS NOTES
Discharge Facility:  [facility]  Discharge Diagnosis:  CP  Admission Date:  10/9/24  Discharge Date:  10/11/24    PCP Appointment Date: Patient encouraged to make an appt with PCP she has a list  Specialist Appointment Date:   OCT 24  2024 01:00 PM - Cardiology Hospital Discharge  [facility] - Mike Martinez, APRN-CNP   Patient encouraged to make an appt with Dr Moya  Hospital Encounter and Summary Linked: Yes  See discharge assessment below for further details     Dr Harish Underwood did sent singular script to pharmacy    Engagement  Call Start Time: 1135 (10/14/2024 11:43 AM)    Medications  Medications reviewed with patient/caregiver?: Yes (10/14/2024 11:43 AM)  Is the patient having any side effects they believe may be caused by any medication additions or changes?: No (10/14/2024 11:43 AM)  Does the patient have all medications ordered at discharge?: No (10/14/2024 11:43 AM)  What is keeping the patient from filling the prescriptions?: Medication reconciliation issue (10/14/2024 11:43 AM)  Prescription Comments: This CM sent a message to Dr Harish Underwood for singular (10/14/2024 11:43 AM)  Medication Comments: Picking up atorvastatin today (10/14/2024 11:43 AM)    Appointments  Does the patient have a primary care provider?: No (She has a list of PCP she will be picking from) (10/14/2024 11:43 AM)  Care Management Interventions: Verified appointment date/time/provider (10/14/2024 11:43 AM)  Care Management Interventions: Advised patient to keep appointment; Advised to schedule with specialist (10/14/2024 11:43 AM)    Self Management  What is the home health agency?: [facility] (10/14/2024 11:43 AM)  What Durable Medical Equipment (DME) was ordered?: n/a (10/14/2024 11:43 AM)    Patient Teaching  Does the patient have access to their discharge instructions?: Yes (10/14/2024 11:43 AM)  Care Management Interventions: Reviewed instructions with patient (10/14/2024 11:43 AM)  What is the patient's  perception of their health status since discharge?: Improving (10/14/2024 11:43 AM)  Is the patient/caregiver able to teach back the hierarchy of who to call/visit for symptoms/problems? PCP, Specialist, Home Health nurse, Urgent Care, ED, 911: Yes (10/14/2024 11:43 AM)  Patient/Caregiver Education Comments: Reviewed new medications (10/14/2024 11:43 AM)    Wrap Up  Wrap Up Additional Comments: Patient states she is improving she has some SOB with stairs. Message sent to discharging MD regarding her Singular. Reviewed upcoming appt with Mike Martinez CNP. Patient encourage to make appt with PCP and Pulmonary--states understanding.REviewed new medications and patient has understanding of indications. This CM gives contact information for non urgent matters (10/14/2024 11:43 AM)

## 2024-10-15 DIAGNOSIS — J45.20 MILD INTERMITTENT ASTHMA WITHOUT COMPLICATION (HHS-HCC): ICD-10-CM

## 2024-10-15 DIAGNOSIS — E11.00 TYPE 2 DIABETES MELLITUS WITH HYPEROSMOLARITY WITHOUT COMA, WITHOUT LONG-TERM CURRENT USE OF INSULIN (MULTI): Primary | Chronic | ICD-10-CM

## 2024-10-15 DIAGNOSIS — I10 PRIMARY HYPERTENSION: ICD-10-CM

## 2024-10-16 ENCOUNTER — PATIENT OUTREACH (OUTPATIENT)
Dept: HOME HEALTH SERVICES | Age: 54
End: 2024-10-16

## 2024-10-16 ENCOUNTER — DOCUMENTATION (OUTPATIENT)
Dept: HOME HEALTH SERVICES | Age: 54
End: 2024-10-16

## 2024-10-16 ENCOUNTER — TELEMEDICINE (OUTPATIENT)
Dept: PHARMACY | Facility: HOSPITAL | Age: 54
End: 2024-10-16

## 2024-10-16 DIAGNOSIS — J45.20 MILD INTERMITTENT ASTHMA WITHOUT COMPLICATION (HHS-HCC): ICD-10-CM

## 2024-10-16 DIAGNOSIS — E11.00 TYPE 2 DIABETES MELLITUS WITH HYPEROSMOLARITY WITHOUT COMA, WITHOUT LONG-TERM CURRENT USE OF INSULIN (MULTI): Chronic | ICD-10-CM

## 2024-10-16 DIAGNOSIS — I10 PRIMARY HYPERTENSION: ICD-10-CM

## 2024-10-16 RX ORDER — TIRZEPATIDE 2.5 MG/.5ML
2.5 INJECTION, SOLUTION SUBCUTANEOUS WEEKLY
Qty: 2 ML | Refills: 0 | Status: SHIPPED | OUTPATIENT
Start: 2024-10-16

## 2024-10-16 RX ORDER — GLYCOPYRROLATE AND FORMOTEROL FUMARATE 9; 4.8 UG/1; UG/1
2 AEROSOL, METERED RESPIRATORY (INHALATION) 2 TIMES DAILY
Qty: 10.7 G | Refills: 11 | Status: SHIPPED | OUTPATIENT
Start: 2024-10-16

## 2024-10-16 NOTE — PROGRESS NOTES
Initial [facility] call completed with Dr Clarke and Jim from pharmacy. Patient states she has been doing ok since DC but she is still having some SOB and chest discomfort. Patient did have a stress test when she was in the hospital. Patient states she has an appointment with pulmonology on 10/28 with Dr Santoyo. Medications reviewed patient is un able to take metformin and she has not taking her victoza since last week. Patient has a couple medication that she can't afford. Patient was on ozempic in the past she has been having challenges with different medications and what her insurance would cover. 2 bigest concerns are  getting her appropriate inhalers and diabetic medications. Patient is using a nicotine patch to help with quitting smoking. Patients chest pain has been more right sided she states she has to stop to catch her breath when she exerts herself. She is on O2. Discussed placing patient on Masimo to checking her HR and Oxygen level. Will order a d dimmer and if elevated may need CT Chest to rule out clots. Patient needs a new PCP prefers am before sleep she works night shift. Jim is going to look into her meds and insurance patient has a CGM ranges are . Next [facility] 10/23 @ 1030     Update from Jim patients insurance will cover the Bevespi for her maintenance inhaler and then also Mounjaro for the GLP1 injections left a message to update patient

## 2024-10-16 NOTE — PROGRESS NOTES
[facility]    Vannessa Soto is a 54 y.o. female was referred to Clinical Pharmacy Team to complete a post-discharge medication optimization and monitoring visit.  The patient was referred for COPD management while in [facility]. Today was our initial visit.     Admission Date: 10/9  Discharge Date: 10/11    Referring Provider: Jameson Mcclure*  PCP: Enriqueta Weeks, APRN-CNP - not with        Subjective   No Known Allergies    [id number] - [place], [place] - [address]  [address] [address]  Phone: [phone] Fax: [phone]    [facility] [place] [facility] [address]  [address] [address]  Phone: [phone] Fax: [phone]    [id number] - [place], [place] - [address]  [address] [address]  Phone: [phone] Fax: [phone]    [facility]  [address]  [facility] [address]  Phone: [phone] Fax: [phone]      Medication System Management:  Affordability/Accessibility: no concerns currently   Adherence/Organization: no issues       Social History     Social History Narrative    Not on file        Notable Medication changes following discharge:  Start: aspirin, lipitor, metformin and jardiance   Stop: glipizide, hydrochlorothiazide   Change: none    HPI  PULMONARY ASSESSMENT  Patient has been diagnosed with: COPD  does not see a pulmonologist  Last visit: n/a    Current Regimen:  Albuterol     Clarifications to above regimen: none   Adverse Effects: none   Appropriate technique? Yes    Historical Treatment  Has not been on any maintenance inhalers in the past     How often do you use your rescue inhaler? Daily     Symptom Assessment:  Current symptoms: dyspnea, cough, and fatigue  Symptoms are remain unchanged  Triggers: exertion  Alleviating factors: rest, rescue inhaler     Exacerbation Hx:  When  was your last hospitalization for an exacerbation? This past admission  When was the last time you were treated with antibiotics and/or steroids? This past admission      Immunization History:  No history     Smoking history:  She has a history of 17.5 pack years. She quit smoking approximately 1 week ago.     Review of Systems        Objective     There were no vitals taken for this visit.   BP Readings from Last 4 Encounters:   10/11/24 110/56   09/24/24 172/89   08/21/24 106/56   06/03/24 170/81      There were no vitals filed for this visit.     LAB  Lab Results   Component Value Date    BILITOT 0.5 10/09/2024    CALCIUM 9.3 10/11/2024    CO2 27 10/11/2024     10/11/2024    CREATININE 0.72 10/11/2024    GLUCOSE 182 (H) 10/11/2024    ALKPHOS 88 10/09/2024    K 4.0 10/11/2024    PROT 6.3 (L) 10/09/2024     10/11/2024    AST 13 10/09/2024    ALT 17 10/09/2024    BUN 22 10/11/2024    ANIONGAP 10 10/11/2024    MG 1.92 10/10/2024    ALBUMIN 4.1 10/09/2024    GFRF 77 05/01/2023     Lab Results   Component Value Date    TRIG 142 10/09/2024    CHOL 193 10/09/2024    LDLCALC 130 (H) 10/09/2024    HDL 35.0 10/09/2024     Lab Results   Component Value Date    HGBA1C 7.8 (H) 10/09/2024         Current Outpatient Medications   Medication Instructions    albuterol 90 mcg/actuation inhaler 1 puff, inhalation, 3 times daily RT    aspirin 81 mg, oral, Daily    atorvastatin (LIPITOR) 40 mg, oral, Daily    Combivent Respimat  mcg/actuation inhaler 1 puff, inhalation, 4 times daily RT    ibuprofen 200 mg, oral, Every 6 hours PRN    Jardiance 25 mg, oral, Daily    lisinopril 5 mg, oral, Daily    metFORMIN (GLUCOPHAGE) 1,000 mg, oral, 2 times daily (morning and late afternoon)    metoclopramide (REGLAN) 10 mg, oral, Daily PRN    montelukast (SINGULAIR) 10 mg, oral, Nightly    nicotine (Nicoderm CQ) 21 mg/24 hr patch 1 patch, transdermal, Daily    pantoprazole (ProtoNix) 40 mg EC tablet TAKE 1 TABLET BY MOUTH TWICE  DAILY    Victoza 3-Stephen 1.8 mg, subcutaneous, Daily        HISTORICAL PHARMACOTHERAPY  DM --> was on ozempic, victoza, glipizide and metformin in the past     DRUG INTERACTIONS  None at time of review      Assessment/Plan   Problem List Items Addressed This Visit       Type 2 diabetes mellitus with hyperosmolarity without coma, without long-term current use of insulin (Multi) (Chronic)    Mild intermittent asthma without complication ([facility])    Primary hypertension       DM  Most recent A1c was 7.8% while admitted     Respiratory   Does not wear oxygen   Breathing is okay, but still getting SOB with exertion   Still having a bad cough   Still having intermittent chest pain too       Medications Changes/Concerns:  -d/c meds:  1. aspirin 81mg daily   2. lipitor 40mg daily  3. singulair 10mg hs  4. nicotine 21mg patch  5. metformin 1,000mg bid  -all sent to [facility]   6. jardiance 25mg daily (received 14 day free supply)  -m2b from [facility]  -stop --> glipizide, hctz and hydroxyzine  -DM --> Jardiance (confirmed does not take metformin and insurance does not cover Victoza anymore)    -has been wearing nicotine patch   -only inhaler she uses is albuterol     -going to start Bevespi 2 puffs daily for her maintenance inhaler  -also will start Mounjaro 2.5mg once weekly     Refills Needed:  -none needed today       Plan/To Do:  -set up on yaw   -have medics also draw a d-dimer   -I will call her [facility] pharmacy to get correct prescription insurance info and see which inhalers and diabetic medications will be covered   -nursing to continue with daily calls       [facility] PAP Status:  -unclear if patient has active insurance or not  -will determine if PAP eligible after confirming with pharmacy       Time Spent with Patient and [facility] Team - Dr. Clarke and Neli, RN via phone call: 25 minutes      Follow Up: 1 week     Continue all meds under the continuation of care with the referring provider and clinical pharmacy  team.    Jim Padlila PharmD     Verbal consent to manage patient's drug therapy was obtained from the patient. They were informed they may decline to participate or withdraw from participation in pharmacy services at any time.

## 2024-10-17 ENCOUNTER — DOCUMENTATION (OUTPATIENT)
Dept: HOME HEALTH SERVICES | Age: 54
End: 2024-10-17

## 2024-10-17 ENCOUNTER — PATIENT OUTREACH (OUTPATIENT)
Dept: HOME HEALTH SERVICES | Age: 54
End: 2024-10-17

## 2024-10-17 DIAGNOSIS — I10 PRIMARY HYPERTENSION: ICD-10-CM

## 2024-10-17 DIAGNOSIS — E13.9 DIABETES 1.5, MANAGED AS TYPE 2 (MULTI): Primary | ICD-10-CM

## 2024-10-17 NOTE — PROGRESS NOTES
[facility] Assessment    Patient's Address:   [address] [place] [address]  [place] [address]  **  If this is not the address patient will receive services - alert team and address in EMR**       Patient Contacts:  Extended Emergency Contact Information  Primary Emergency Contact: Hosea Mayorga  Mobile Phone: [phone]  Relation: Parent  Secondary Emergency Contact: Zuhair Sorto  Mobile Phone: [phone]  Relation: Daughter     Attempted to contact pt in regards to setting up home visit with the goal of setting up masimo. No answer, left message and phone number.                           Patient's Preferred Phone: [phone]  Patient's E-mail: [email or web address]

## 2024-10-18 ENCOUNTER — PATIENT OUTREACH (OUTPATIENT)
Dept: CARE COORDINATION | Age: 54
End: 2024-10-18

## 2024-10-18 NOTE — PROGRESS NOTES
[facility]itions [facility]    Patient's Address:   [address]  [place] [address]  **  If this is not the address patient will receive services - alert team and address in EMR**       Patient Contacts:  Extended Emergency Contact Information  Primary Emergency Contact: Hosea Mayorga  Mobile Phone: [phone]  Relation: Parent  Secondary Emergency Contact: Zuhair Sorto  Mobile Phone: [phone]  Relation: Daughter                                Patient's Preferred Phone: [phone]  Patient's E-mail: [email or web address] - [employer] Anytime | Anywhere            Left voicemail again today attempting to contact pt in regards to scheduling appt for lab draw and masimo set up.

## 2024-10-18 NOTE — PROGRESS NOTES
"Daily Call Note:   1640 - Daily call completed with pt this afternoon. Pt states that she feels \"sick\" today - c/o losing her voice, deep nonproductive cough. No exposure to  COVID, per pt. She is using an OTC cough medicine and has been using her inhalers as ordered.   Updated pt that her PCP appt was scheduled for 10/22 at 0900 - she verbalized understanding and had already seen that in her MyChart.  Next [facility] is scheduled for 10/23 at 1030.  No other questions or concerns at this time.    Pt Education: POC  Barriers: none  Topics for Daily Review: PCP appt; feels sick; cough;  Pt demonstrates clear understanding: Yes    Daily Weight:  There were no vitals filed for this visit.   Last 3 Weights:  Wt Readings from Last 7 Encounters:   10/09/24 93.9 kg (207 lb 0.2 oz)   09/24/24 91.6 kg (202 lb)   08/21/24 95.3 kg (210 lb)   06/03/24 92.1 kg (203 lb)   12/18/23 91.8 kg (202 lb 6.1 oz)   11/01/23 94.8 kg (209 lb)   04/28/23 91.6 kg (202 lb)       Masimo Device: No   Masimo Clinical Impression: n/a    Virtual Visits--Scheduled (Most Recent Date at Top)  Follow up Appointments  Recent Visits  Date Type Provider Dept   10/16/24 Telemedicine Fiona Mcclure MD [facility]   Showing recent visits within past 30 days and meeting all other requirements  Future Appointments  No visits were found meeting these conditions.  Showing future appointments within next 90 days and meeting all other requirements       Frequency of RN Calls & Virtual Visits per Team Agreement: [facility] Frequency: Daily    Medication issues Addressed (what was done): none    Follow up appointments scheduled by [facility] Staff: Neli scheduled PCP appt with Dr. Belle for 10/22 at 0900.  Referrals made by [facility] staff: none        "

## 2024-10-19 ENCOUNTER — PATIENT OUTREACH (OUTPATIENT)
Dept: HOME HEALTH SERVICES | Age: 54
End: 2024-10-19

## 2024-10-19 DIAGNOSIS — E11.00 TYPE 2 DIABETES MELLITUS WITH HYPEROSMOLARITY WITHOUT COMA, WITHOUT LONG-TERM CURRENT USE OF INSULIN (MULTI): Primary | Chronic | ICD-10-CM

## 2024-10-19 DIAGNOSIS — J45.20 MILD INTERMITTENT ASTHMA WITHOUT COMPLICATION (HHS-HCC): ICD-10-CM

## 2024-10-19 DIAGNOSIS — I10 PRIMARY HYPERTENSION: ICD-10-CM

## 2024-10-19 NOTE — PROGRESS NOTES
Incoming call from patient stating that she needs to reschedule the medic visit this morning, she won't be available, rescheduled for Monday 10/21/24. Next [facility] 10/23/24 at 1030, no other questions at this time.

## 2024-10-21 ENCOUNTER — DOCUMENTATION (OUTPATIENT)
Dept: HOME HEALTH SERVICES | Age: 54
End: 2024-10-21

## 2024-10-21 DIAGNOSIS — I10 PRIMARY HYPERTENSION: Primary | ICD-10-CM

## 2024-10-21 RX ORDER — ACETAMINOPHEN 500 MG
TABLET ORAL
Start: 2024-10-21

## 2024-10-22 ENCOUNTER — APPOINTMENT (OUTPATIENT)
Dept: PRIMARY CARE | Facility: CLINIC | Age: 54
End: 2024-10-22

## 2024-10-22 ENCOUNTER — PATIENT OUTREACH (OUTPATIENT)
Dept: HOME HEALTH SERVICES | Age: 54
End: 2024-10-22

## 2024-10-22 NOTE — PROGRESS NOTES
"Daily Call Note: Incoming call from patient to state that she was at the PCP appointment that was made by [facility] program and once she got to the  appointment, she was informed that the provider does not accept her insurance. She also missed work last night because she was not feeling well, and planned to get a work excuse and was unable to get that. She felt like she had a \"head cold,\" with cough and scratchy throat. She plans to return to work tonight and needs an excuse for last night. Patient denies GI/ issues. She denies fever, chills, nausea and vomiting and appetite is ok. Secure chat to Dr. Mckeon with patient status and request for work excuse. Next [facility] 10/23/24 at 1030, verbalized understanding. No other questions at this time.     Per Dr. Mckeon- She does not feel comfortable writing one since she had zero interaction with this pt. Per Dr. Mckeon, patient needs to be evaluated in some sort of capacity before getting a note, it looks like she has [facility] appt tomorrow, the provider tomorrow can decide if they will write for a note retroactively for patient tomorrow. Outreach to patient, notified of above, she verbalized understanding.    New PCP appointment scheduled, previous didn't take her insurance. Dr. Taryn Dunn 11/11/24 at 1120   [address] [place] [id number]    Pt Education: per POC  Barriers:   Topics for Daily Review:   Pt demonstrates clear understanding: Yes    Daily Weight:  There were no vitals filed for this visit.   Last 3 Weights:  Wt Readings from Last 7 Encounters:   10/09/24 93.9 kg (207 lb 0.2 oz)   10/21/24 93.6 kg (206 lb 4.8 oz)   09/24/24 91.6 kg (202 lb)   08/21/24 95.3 kg (210 lb)   06/03/24 92.1 kg (203 lb)   12/18/23 91.8 kg (202 lb 6.1 oz)   11/01/23 94.8 kg (209 lb)       Masimo Device: yes   Masimo Clinical Impression: stable    Virtual Visits--Scheduled (Most Recent Date at Top)  Follow up Appointments  Recent Visits  No visits were found meeting these " conditions.  Showing recent visits within past 30 days and meeting all other requirements  Today's Visits  Date Type Provider Dept   10/22/24 Appointment Jean Marie Belle MD [place] [address]   Showing today's visits and meeting all other requirements  Future Appointments  No visits were found meeting these conditions.  Showing future appointments within next 90 days and meeting all other requirements       Frequency of RN Calls & Virtual Visits per Team Agreement: [facility] Frequency: Daily    Medication issues Addressed (what was done): none    Follow up appointments scheduled by [facility] Staff: none  Referrals made by [facility] staff: none

## 2024-10-23 ENCOUNTER — APPOINTMENT (OUTPATIENT)
Dept: CARE COORDINATION | Age: 54
End: 2024-10-23

## 2024-10-23 ENCOUNTER — APPOINTMENT (OUTPATIENT)
Dept: PHARMACY | Facility: HOSPITAL | Age: 54
End: 2024-10-23

## 2024-10-23 ENCOUNTER — PATIENT OUTREACH (OUTPATIENT)
Dept: HOME HEALTH SERVICES | Age: 54
End: 2024-10-23

## 2024-10-23 DIAGNOSIS — J45.20 MILD INTERMITTENT ASTHMA WITHOUT COMPLICATION (HHS-HCC): ICD-10-CM

## 2024-10-23 DIAGNOSIS — I10 PRIMARY HYPERTENSION: ICD-10-CM

## 2024-10-23 DIAGNOSIS — E11.00 TYPE 2 DIABETES MELLITUS WITH HYPEROSMOLARITY WITHOUT COMA, WITHOUT LONG-TERM CURRENT USE OF INSULIN (MULTI): Chronic | ICD-10-CM

## 2024-10-23 RX ORDER — AZITHROMYCIN 250 MG/1
250 TABLET, FILM COATED ORAL
COMMUNITY
Start: 2024-10-22

## 2024-10-23 RX ORDER — BROMPHENIRAMINE MALEATE, PSEUDOEPHEDRINE HYDROCHLORIDE, AND DEXTROMETHORPHAN HYDROBROMIDE 2; 30; 10 MG/5ML; MG/5ML; MG/5ML
SYRUP ORAL
COMMUNITY
Start: 2024-10-22

## 2024-10-23 NOTE — PROGRESS NOTES
[facility]    Vannessa Soto is a 54 y.o. female was referred to Clinical Pharmacy Team to complete a post-discharge medication optimization and monitoring visit.  The patient was referred for COPD management while in [facility]. Today was our second visit.       Referring Provider: Bertram Howard MD  PCP: Taryn Dunn MD - next visit: 11/11      Subjective   No Known Allergies    [id number] - [place], [place] - [address]  [address] [address]  Phone: [phone] Fax: [phone]    [facility] [place] - [address]  [address] [address]  Phone: [phone] Fax: [phone]    [id number] - [place], [place] - [address]  [address] [address]  Phone: [phone] Fax: [phone]    [facility]  [address]  [facility] [address]  Phone: [phone] Fax: [phone]      Medication System Management:  Affordability/Accessibility: no concerns   Adherence/Organization: no issues       Social History     Social History Narrative    Not on file          HPI  PULMONARY ASSESSMENT  Patient has been diagnosed with: COPD  does not see a pulmonologist  Last visit: n/a     Current Regimen:  Albuterol      Clarifications to above regimen: none   Adverse Effects: none   Appropriate technique? Yes     Historical Treatment  Has not been on any maintenance inhalers in the past      How often do you use your rescue inhaler? Daily      Symptom Assessment:  Current symptoms: dyspnea, cough, and fatigue  Symptoms are remain unchanged  Triggers: exertion  Alleviating factors: rest, rescue inhaler      Exacerbation Hx:  When was your last hospitalization for an exacerbation? This past admission  When was the last time you were treated with antibiotics and/or steroids? This past admission       Immunization History:  No history      Smoking  history:  She has a history of 17.5 pack years. She quit smoking approximately 1 week ago.     Review of Systems        Objective     There were no vitals taken for this visit.   BP Readings from Last 4 Encounters:   10/11/24 110/56   10/21/24 157/87   09/24/24 172/89   08/21/24 106/56      There were no vitals filed for this visit.     LAB  Lab Results   Component Value Date    BILITOT 0.5 10/09/2024    CALCIUM 9.3 10/11/2024    CO2 27 10/11/2024     10/11/2024    CREATININE 0.72 10/11/2024    GLUCOSE 182 (H) 10/11/2024    ALKPHOS 88 10/09/2024    K 4.0 10/11/2024    PROT 6.3 (L) 10/09/2024     10/11/2024    AST 13 10/09/2024    ALT 17 10/09/2024    BUN 22 10/11/2024    ANIONGAP 10 10/11/2024    MG 1.92 10/10/2024    ALBUMIN 4.1 10/09/2024    GFRF 77 05/01/2023     Lab Results   Component Value Date    TRIG 142 10/09/2024    CHOL 193 10/09/2024    LDLCALC 130 (H) 10/09/2024    HDL 35.0 10/09/2024     Lab Results   Component Value Date    HGBA1C 7.8 (H) 10/09/2024         Current Outpatient Medications   Medication Instructions    albuterol 90 mcg/actuation inhaler 1 puff, inhalation, 3 times daily RT    aspirin 81 mg, oral, Daily    atorvastatin (LIPITOR) 40 mg, oral, Daily    blood pressure monitor kit Use as directed    glycopyrrolate-formoteroL (Bevespi Aerosphere) 9-4.8 mcg HFA aerosol inhaler 2 puffs, inhalation, 2 times daily    Jardiance 25 mg, oral, Daily    montelukast (SINGULAIR) 10 mg, oral, Nightly    Mounjaro 2.5 mg, subcutaneous, Weekly    nicotine (Nicoderm CQ) 21 mg/24 hr patch 1 patch, transdermal, Daily    pantoprazole (ProtoNix) 40 mg EC tablet TAKE 1 TABLET BY MOUTH TWICE DAILY          Assessment/Plan   Problem List Items Addressed This Visit       Type 2 diabetes mellitus with hyperosmolarity without coma, without long-term current use of insulin (Multi) (Chronic)    Mild intermittent asthma without complication ([facility])    Primary hypertension       DM  Most recent A1c was 7.8%  while admitted   She has a dexcom to monitor her sugars continuously   Usually ranges around the mid-100's  Highest sugar she saw this past week was 202  Respiratory   Does not wear oxygen   Breathing is okay, but still getting SOB with exertion   She is set up on yaw now so being monitored by nursing 24/7 --> saturations have all been in the upper 90's on room air   Her chest pain has improved and actually feels better when able to take a deep breath   She still has not smoked since being home       Medications Changes/Concerns:  -started her first Mounjaro dose last Wednesday --> will take second dose today   -also started using her Bevespi inhaler twice daily     -was prescribed a z-bruna and cough syrup yesterday from Urgent Care but has not picked up and started     Refills Needed:  -none needed today       Plan/To Do:  -continue to monitor sugars  -nursing will continue with calls       [facility] PAP Status:  -will try to enroll  -she is going to get me a copy of her 1040 form to submit for possible enrollment       Time Spent with Patient and [facility] Team - Dr. Howard and BHUPENDRA Duarte via phone call: 30 minutes      Follow Up: 1 week     Continue all meds under the continuation of care with the referring provider and clinical pharmacy team.    Jim Padilla, Mehul     Verbal consent to manage patient's drug therapy was obtained from the patient. They were informed they may decline to participate or withdraw from participation in pharmacy services at any time.

## 2024-10-23 NOTE — PROGRESS NOTES
Daily Call Note: [facility] weekly call with the patient, Dr. KACI Howard and PharmD LATIA Padilla.   She stated that she is better, and the chest pain she had experienced in the past has dissipated, but she still has some chest pain and SOB with exertion. She takes a deep breath which helps relieve the pain along with the use of her inhaler. She also complained of having tingling in her jaws when she has to take a deep breath. When she has to take the stairs more than twice day she feels SOB. MD advised her to follow up with cardiology regarding the persistence of the chest pain. She has an appointment with cardiology tomorrow. She has learned to pace her exertions to diminish episodes of SOB and will also follow up with the pulmonologist on 10/28 (does not show in Epic)  She stated her blood glucose levels have run in the 130s - the highest she has experienced was 202. She uses CGM DexCom 7 to monitor. She stated that she has a decreased appetitive since starting the Moujaro - she will take her second dose today. The pharmacist discussed the PAP with her regarding Jardiance, Mounjaro and inhalers - the patient is to email her the documents.  The patient was given the information regarding her New PCP Visit with Dr. LATHA Dunn.     Pt demonstrates clear understanding: Yes    Daily Weight:  There were no vitals filed for this visit.   Last 3 Weights:  Wt Readings from Last 7 Encounters:   10/09/24 93.9 kg (207 lb 0.2 oz)   10/21/24 93.6 kg (206 lb 4.8 oz)   09/24/24 91.6 kg (202 lb)   08/21/24 95.3 kg (210 lb)   06/03/24 92.1 kg (203 lb)   12/18/23 91.8 kg (202 lb 6.1 oz)   11/01/23 94.8 kg (209 lb)       Masimo Device: Yes   Masimo Clinical Impression: stable on room air - ranges %    Virtual Visits--Scheduled (Most Recent Date at Top)  Follow up Appointments  Recent Visits  No visits were found meeting these conditions.  Showing recent visits within past 30 days and meeting all other requirements  Future Appointments  Date  Type Provider Dept   11/11/24 Appointment Taryn Dunn MD Do [id number]   Showing future appointments within next 90 days and meeting all other requirements       Frequency of RN Calls & Virtual Visits per Team Agreement: [facility] Frequency: Daily    Medication issues Addressed (what was done): jardiance coverage

## 2024-10-24 ENCOUNTER — APPOINTMENT (OUTPATIENT)
Dept: CARDIOLOGY | Facility: CLINIC | Age: 54
End: 2024-10-24
Payer: COMMERCIAL

## 2024-10-24 ENCOUNTER — PATIENT OUTREACH (OUTPATIENT)
Dept: HOME HEALTH SERVICES | Age: 54
End: 2024-10-24

## 2024-10-24 VITALS
HEIGHT: 62 IN | DIASTOLIC BLOOD PRESSURE: 80 MMHG | SYSTOLIC BLOOD PRESSURE: 122 MMHG | HEART RATE: 76 BPM | BODY MASS INDEX: 38.04 KG/M2 | WEIGHT: 206.7 LBS

## 2024-10-24 DIAGNOSIS — I10 PRIMARY HYPERTENSION: ICD-10-CM

## 2024-10-24 DIAGNOSIS — R07.9 CHEST PAIN, UNSPECIFIED TYPE: Primary | ICD-10-CM

## 2024-10-24 DIAGNOSIS — E78.5 HYPERLIPIDEMIA, UNSPECIFIED HYPERLIPIDEMIA TYPE: ICD-10-CM

## 2024-10-24 PROCEDURE — 3008F BODY MASS INDEX DOCD: CPT | Performed by: NURSE PRACTITIONER

## 2024-10-24 PROCEDURE — 3051F HG A1C>EQUAL 7.0%<8.0%: CPT | Performed by: NURSE PRACTITIONER

## 2024-10-24 PROCEDURE — 1036F TOBACCO NON-USER: CPT | Performed by: NURSE PRACTITIONER

## 2024-10-24 PROCEDURE — 99213 OFFICE O/P EST LOW 20 MIN: CPT | Performed by: NURSE PRACTITIONER

## 2024-10-24 PROCEDURE — 3079F DIAST BP 80-89 MM HG: CPT | Performed by: NURSE PRACTITIONER

## 2024-10-24 PROCEDURE — 3074F SYST BP LT 130 MM HG: CPT | Performed by: NURSE PRACTITIONER

## 2024-10-24 PROCEDURE — 3050F LDL-C >= 130 MG/DL: CPT | Performed by: NURSE PRACTITIONER

## 2024-10-24 RX ORDER — COLCHICINE 0.6 MG/1
0.6 TABLET ORAL 2 TIMES DAILY
Qty: 20 TABLET | Refills: 0 | Status: SHIPPED | OUTPATIENT
Start: 2024-10-24 | End: 2024-11-03

## 2024-10-24 ASSESSMENT — ENCOUNTER SYMPTOMS
GASTROINTESTINAL NEGATIVE: 1
MUSCULOSKELETAL NEGATIVE: 1
PALPITATIONS: 0
DYSPNEA ON EXERTION: 0
ORTHOPNEA: 0
EYES NEGATIVE: 1
RESPIRATORY NEGATIVE: 1
PND: 0
CONSTITUTIONAL NEGATIVE: 1
NEAR-SYNCOPE: 0
ENDOCRINE NEGATIVE: 1

## 2024-10-24 NOTE — PATIENT INSTRUCTIONS
Please call 911 or return to local emergency department for any chest pain, shortness of breath or palpitations.    Please continue to take medications as prescribed    Monitor blood pressure and weights daily    Call the office with any problems

## 2024-10-24 NOTE — PROGRESS NOTES
1920- Outreach to patient because she is not transmitting on "[employer] monitor, no answer and unable to leave voicemail. Outreach to son, Hosea, no answer, unable to leave voicemail. Notified DANIELLE Verdugo RN.        additional history taking/documentation/direct patient care (not related to procedure)/consult w/ pt's family directly relating to pts condition/interpretation of diagnostic studies/consultation with other physicians

## 2024-10-24 NOTE — PROGRESS NOTES
CARDIOLOGY OFFICE VISIT      CHIEF COMPLAINT  Chief Complaint   Patient presents with    Hospital Follow-up     TCM Pt. From the hospital discharged 10/11/2024.       HISTORY OF PRESENT ILLNESS  54-year-old female with past medical history of COPD, diabetes mellitus type 2, hyperlipidemia, hypertension, current nicotine abuse, obesity who presented to the office today after being hospitalized 2 weeks ago with chest pain.  During that time she underwent an echocardiogram which showed no significant wall motion or valvular abnormalities.  She also underwent nuclear stress testing which was unremarkable low risk.  She does have a follow-up appointment with pulmonology.  She still concerned that she gets chest pain while just sitting there.  Chest pain is reproducible upon my examination.  Denies any palpitations or increased shortness of breath.          RECENT TESTING  The following results have been reviewed and updated;  [address] [address]   Tel [phone] Fax [phone]     TRANSTHORACIC ECHOCARDIOGRAM REPORT     Patient Name:      HAYLEE SANIA Callejas Physician:    [id number] Delmar Chapin DO  Study Date:        10/10/2024           Ordering Provider:    [id number] YNAY GALLOWAY  MRN/PID:           [id number]             Fellow:  Accession#:        [id number]         Nurse:                Lior Beck RN  Date of Birth/Age: 1970 / 54 years  Sonographer:          Melinda Grant RDCS  Gender:            F                    Additional Staff:  Height:            157.48 cm            Admit Date:           10/9/2024  Weight:            90.72 kg             Admission Status:     Inpatient -                                                                Routine  BSA / BMI:          1.91 m2 / 36.58      Department Location:  [facility]                     kg/m2                                      Echo Lab  Blood Pressure: 140 /72 mmHg     Study Type:    TRANSTHORACIC ECHO (TTE) COMPLETE  Diagnosis/ICD: Chest pain, unspecified-R07.9  Indication:    Chest Pain  CPT Codes:     Echo Complete w Full [id number]     Patient History:  Smoker:            Current.  Diabetes:          Yes  Pertinent History: HTN, Hyperlipidemia, Chest Pain and COPD.     Study Detail: The following Echo studies were performed: 2D, M-Mode, Doppler and                color flow. Definity used as a contrast agent for endocardial                border definition. Total contrast used for this procedure was 1 mL                via IV push. The patient was awake.        PHYSICIAN INTERPRETATION:  Left Ventricle: The left ventricular systolic function is normal, with a visually estimated ejection fraction of 60%. The left ventricle was not well visualized. The left ventricular ejection fraction could not be measured. There are no regional wall motion abnormalities. The left ventricular cavity size is normal. Spectral Doppler shows a normal pattern of left ventricular diastolic filling.  LV Wall Scoring:  All segments are normal.     Left Atrium: The left atrium is normal in size.  Right Ventricle: The right ventricle is normal in size. There is normal right ventricular global systolic function.  Right Atrium: The right atrium is normal in size.  Aortic Valve: The aortic valve appears structurally normal. There is minimal aortic valve cusp calcification. There is no evidence of aortic valve stenosis.  The aortic valve dimensionless index is 0.58. There is no evidence of aortic valve regurgitation. The peak instantaneous gradient of the aortic valve is 14.3 mmHg. The mean gradient of the aortic valve is 7.0 mmHg.  Mitral Valve: The mitral valve is normal in structure. There is no evidence of mitral valve stenosis.  There is normal mitral valve leaflet mobility. There is trace mitral valve regurgitation.  Tricuspid Valve: The tricuspid valve is structurally normal. There is normal tricuspid valve leaflet mobility. There is trace tricuspid regurgitation.  Pulmonic Valve: The pulmonic valve is structurally normal. There is no indication of pulmonic valve regurgitation.  Pericardium: No pericardial effusion noted.  Aorta: The aortic root is normal.  Pulmonary Artery: The pulmonary artery is not well visualized.  Systemic Veins: The inferior vena cava appears normal in size.  In comparison to the previous echocardiogram(s): There are no prior studies on this patient for comparison purposes.        CONCLUSIONS:   1. The left ventricle was not well visualized. The left ventricular ejection fraction could not be measured.   2. The left ventricular systolic function is normal, with a visually estimated ejection fraction of 60%.   3. No regional wall motion abnormalities.   4. There is normal right ventricular global systolic function.   5. There is no evidence of mitral valve stenosis.   6. Trace mitral valve regurgitation.   7. Trace tricuspid regurgitation is visualized.   8. Aortic valve stenosis is not present.   9. The pulmonary artery is not well visualized.     Interpreted By:  Delmar Haley,   STUDY:  NUCLEAR STRESS TEST; 10/11/2024 11:44 am      Performing facility:  [facility]      Patient name: HAYLEE MARSHALL      Gender: F      Age: 53 y/o      Height: .5 cm cm; Weight:  WT 93.9 kg kg.;      INDICATION:  Signs/Symptoms:chest pain.      HISTORY:      REASON FOR LEXISCAN: Unstable      COMPARISON:  None.      ACCESSION NUMBER(S):  [id number]      ORDERING CLINICIAN:  QAMAR TOSCANO      TECHNIQUE:  The patient had myocardial perfusion imaging performed using a  1 day  protocol. Stress injection 34.6 mCi of Myoview IV at 20 seconds after  rapid injection of Lexiscan. Rest injection 10.4 mCi of Myoview IV at  rest.  The patient had a rapid injection of  0.4 mg of Lexiscan IV  over 10 seconds. Imaging was performed by gated tomographic technique.      FINDINGS:  STRESS TEST RESULTS:  Resting heart rate was 58 BPM.  Resting blood pressure was 127/67.  Resting electrocardiogram revealed normal sinus rhythm, right bundle  branch block. There were no significant ischemic ECG changes or  dysrhythmias. The patient  did not have chest pains/symptoms during  procedure. There was a normal recovery phase.      IMAGING RESULTS:  Image quality was  good.  Rest and stress tomographic images were reviewed and revealed normal  perfusion without evidence of ischemia, myocardial infarction, or  left ventricular dilatation with stress. Overall left ventricular  systolic function appeared to be normal without regional wall motion  abnormalities. Ejection fraction was 80%.  TID is 0.97 and is normal.  There  was  evidence of  diaphragmatic attenuation artifact.      IMPRESSION:  Normal Lexiscan Myoview cardiac perfusion stress test.  No evidence of ischemia or myocardial infarction by perfusion imaging.  Normal left ventricular systolic function, ejection fraction 80%.  Noninvasive risk stratification-low risk.            Past Medical History  Past Medical History:   Diagnosis Date    Anxiety     Arthritis     COPD (chronic obstructive pulmonary disease) (Multi)     Diabetes mellitus (Multi)     GERD (gastroesophageal reflux disease)     Hemodialysis status (CMS-HCC)     Hyperlipidemia     Hypertension     Irritable bowel syndrome     Neuromuscular disorder (Multi)     Substance addiction (Multi)     12/15/23 states sober for 6 years    Wears glasses        Social History  Social History     Tobacco Use    Smoking status: Former     Current packs/day: 0.00     Average packs/day: 0.5 packs/day for 35.0 years (17.5 ttl pk-yrs)     Types: Cigarettes     Start date: 10/1988     Quit date: 10/2023     Years since quittin.0    Smokeless tobacco:  "Never   Vaping Use    Vaping status: Some Days    Substances: Nicotine    Devices: [employer] tank   Substance Use Topics    Alcohol use: Not Currently    Drug use: Not Currently     Comment: h/o substance abuse -years ago \"crack\"       Family History     Family History   Problem Relation Name Age of Onset    Lung cancer Mother      Coronary artery disease Mother      Diabetes type II Mother      Other (sepsis) Sister      Seizures Sibling      Parkinsonism Sibling      Other (chronic kidney disease) Other grandfather     Colon cancer Other grandfather         Allergies:  No Known Allergies     Outpatient Medications:  Current Outpatient Medications   Medication Instructions    albuterol 90 mcg/actuation inhaler 1 puff, inhalation, 3 times daily RT    aspirin 81 mg, oral, Daily    atorvastatin (LIPITOR) 40 mg, oral, Daily    azithromycin (ZITHROMAX) 250 mg    blood pressure monitor kit Use as directed    brompheniramine-pseudoeph-DM 2-30-10 mg/5 mL syrup     glycopyrrolate-formoteroL (Bevespi Aerosphere) 9-4.8 mcg HFA aerosol inhaler 2 puffs, inhalation, 2 times daily    Jardiance 25 mg, oral, Daily    montelukast (SINGULAIR) 10 mg, oral, Nightly    Mounjaro 2.5 mg, subcutaneous, Weekly    nicotine (Nicoderm CQ) 21 mg/24 hr patch 1 patch, transdermal, Daily    pantoprazole (ProtoNix) 40 mg EC tablet TAKE 1 TABLET BY MOUTH TWICE DAILY          REVIEW OF SYSTEMS  Review of Systems   Constitutional: Negative.   HENT: Negative.     Eyes: Negative.    Cardiovascular:  Negative for chest pain, dyspnea on exertion, near-syncope, orthopnea, palpitations and paroxysmal nocturnal dyspnea.   Respiratory: Negative.     Endocrine: Negative.    Skin: Negative.    Musculoskeletal: Negative.    Gastrointestinal: Negative.          VITALS  Vitals:    10/24/24 1258   BP: 122/80   Pulse: 76       PHYSICAL EXAM  Constitutional:       Appearance: Healthy appearance. Not in distress.   Neck:      Vascular: No JVR. JVD normal. "   Pulmonary:      Effort: Pulmonary effort is normal.      Breath sounds: Normal breath sounds. No wheezing. No rhonchi. No rales.   Chest:      Chest wall: Not tender to palpatation.   Cardiovascular:      PMI at left midclavicular line. Normal rate. Regular rhythm. Normal S1. Normal S2.       Murmurs: There is no murmur.      No gallop.  No click. No rub.   Pulses:     Intact distal pulses.   Edema:     Peripheral edema absent.   Abdominal:      General: Bowel sounds are normal.      Palpations: Abdomen is soft.      Tenderness: There is no abdominal tenderness.   Musculoskeletal: Normal range of motion.         General: No tenderness. Skin:     General: Skin is warm and dry.   Neurological:      General: No focal deficit present.      Mental Status: Alert and oriented to person, place and time.           ASSESSMENT AND PLAN  Diagnoses and all orders for this visit:  Chest pain, unspecified type  Hyperlipidemia, unspecified hyperlipidemia type  Primary hypertension    Patient doing well posthospitalization.  Still has bouts of chest pain.  Chest pain is reproducible in nature.  Blood pressure and heart rate remained stable.  Denies any increased shortness of breath, palpitations or dizziness  Will trial colchicine to see if it helps her pain, prescription given.  Patient to follow-up in 2 to 4 weeks after trial of colchicine    Mike [facility]  Adult Gerontology Acute Care Nurse Practitioner   [facility]  Pager: [phone]    [unfilled]    **Disclaimer: This note was dictated by speech recognition, and every effort has been made to prevent any error in transcription, however minor errors may be present**

## 2024-10-24 NOTE — PROGRESS NOTES
0356- Masimo transmission disconnected. Phoned patient, no answer, voicemail full.     0400-Sent SMS.       0404- Vitals signs transmitting.

## 2024-10-24 NOTE — PROGRESS NOTES
Daily call completed patient was seen by cardio today he added colchicine to her meds. Recommended that the patient make sure she follows up with Pulmonary on Monday vitals in the chart stable patient on masimo she has also been stable on masimo no medication needs she states she did sent her paperwork to Jim will confirm she received questions and concerns addressed     Jim stated she did not get paperwork yet

## 2024-10-25 ENCOUNTER — PATIENT OUTREACH (OUTPATIENT)
Dept: HOME HEALTH SERVICES | Age: 54
End: 2024-10-25
Payer: COMMERCIAL

## 2024-10-25 NOTE — PROGRESS NOTES
Incoming call from patient after missed call from nurse. Patient states she is tired today. She reports she had an episode of CP at work last night. She denies CP and SOB today. She states she is going to  the colchicine today. She states she emailed the PAP paperwork to Jim, but I provided her with Jim's phone number and she will text it today. She checked her BP today, but states she's not at home and thinks it was 130/, she couldn't remember diastolic but stated that it was ok. Her blood sugar today was 132. Next [facility] 10/30/24 at 1230, verbalized understanding. No other questions at this time.

## 2024-10-26 ENCOUNTER — PATIENT OUTREACH (OUTPATIENT)
Dept: CARE COORDINATION | Age: 54
End: 2024-10-26
Payer: COMMERCIAL

## 2024-10-26 ENCOUNTER — PATIENT OUTREACH (OUTPATIENT)
Dept: HOME HEALTH SERVICES | Age: 54
End: 2024-10-26
Payer: COMMERCIAL

## 2024-10-26 NOTE — PROGRESS NOTES
[facility] at Home Nursing Note: 0422    Called patient as the vital signs are not transmitting  No answer and voice mail is full  Will call back again     0445:   Vital Signs are transmitting:   HR: 75, RR: 18, SPO2: 96%

## 2024-10-26 NOTE — PROGRESS NOTES
"Daily Call Note:   1715 - Daily call completed with pt today. She states that the is \"ok.\"   Call was very brief as pt was headed out the door to go to the store.   Confirmed the next [facility] is 10/30 cr0041.  No other questions or concerns.    Pt Education: .  Barriers: leaving for the store.  Topics for Daily Review: .  Pt demonstrates clear understanding: Yes    Daily Weight:  There were no vitals filed for this visit.   Last 3 Weights:  Wt Readings from Last 7 Encounters:   10/24/24 93.8 kg (206 lb 11.2 oz)   10/09/24 93.9 kg (207 lb 0.2 oz)   10/21/24 93.6 kg (206 lb 4.8 oz)   09/24/24 91.6 kg (202 lb)   08/21/24 95.3 kg (210 lb)   06/03/24 92.1 kg (203 lb)   12/18/23 91.8 kg (202 lb 6.1 oz)       Masimo Device: Yes   Masimo Clinical Impression: WNL    Virtual Visits--Scheduled (Most Recent Date at Top)  Follow up Appointments  Recent Visits  No visits were found meeting these conditions.  Showing recent visits within past 30 days and meeting all other requirements  Future Appointments  Date Type Provider Dept   11/11/24 Appointment Taryn Dunn MD Do [place] [id number]   Showing future appointments within next 90 days and meeting all other requirements       Frequency of RN Calls & Virtual Visits per Team Agreement: Healthy at Home Frequency: Daily    Medication issues Addressed (what was done): none    Follow up appointments scheduled by [facility] Staff: none  Referrals made by [facility] staff: none        "

## 2024-10-27 ENCOUNTER — PATIENT OUTREACH (OUTPATIENT)
Dept: HOME HEALTH SERVICES | Age: 54
End: 2024-10-27
Payer: COMMERCIAL

## 2024-10-27 NOTE — PROGRESS NOTES
0830 - Off Masimo. Patient called, no answer, LVM to call back. Will continue to monitor.     1220 - Off Masimo. Patient called, no answer, LVM to call back. Will continue to monitor.

## 2024-10-28 ENCOUNTER — PATIENT OUTREACH (OUTPATIENT)
Dept: CARDIOLOGY | Facility: CLINIC | Age: 54
End: 2024-10-28
Payer: COMMERCIAL

## 2024-10-29 ENCOUNTER — PATIENT OUTREACH (OUTPATIENT)
Dept: HOME HEALTH SERVICES | Age: 54
End: 2024-10-29
Payer: COMMERCIAL

## 2024-10-29 DIAGNOSIS — J45.20 MILD INTERMITTENT ASTHMA WITHOUT COMPLICATION (HHS-HCC): ICD-10-CM

## 2024-10-29 DIAGNOSIS — E11.00 TYPE 2 DIABETES MELLITUS WITH HYPEROSMOLARITY WITHOUT COMA, WITHOUT LONG-TERM CURRENT USE OF INSULIN (MULTI): Primary | ICD-10-CM

## 2024-10-29 DIAGNOSIS — I10 PRIMARY HYPERTENSION: ICD-10-CM

## 2024-10-30 ENCOUNTER — PATIENT OUTREACH (OUTPATIENT)
Dept: CARE COORDINATION | Age: 54
End: 2024-10-30

## 2024-10-30 ENCOUNTER — TELEMEDICINE (OUTPATIENT)
Dept: PHARMACY | Facility: HOSPITAL | Age: 54
End: 2024-10-30
Payer: COMMERCIAL

## 2024-10-30 ENCOUNTER — APPOINTMENT (OUTPATIENT)
Dept: CARE COORDINATION | Age: 54
End: 2024-10-30
Payer: COMMERCIAL

## 2024-10-30 DIAGNOSIS — E11.9 TYPE 2 DIABETES MELLITUS WITHOUT COMPLICATION, WITHOUT LONG-TERM CURRENT USE OF INSULIN (MULTI): ICD-10-CM

## 2024-10-30 DIAGNOSIS — I10 PRIMARY HYPERTENSION: ICD-10-CM

## 2024-10-30 DIAGNOSIS — J45.20 MILD INTERMITTENT ASTHMA WITHOUT COMPLICATION (HHS-HCC): ICD-10-CM

## 2024-10-30 DIAGNOSIS — J43.9 PULMONARY EMPHYSEMA, UNSPECIFIED EMPHYSEMA TYPE (MULTI): ICD-10-CM

## 2024-10-30 DIAGNOSIS — E11.00 TYPE 2 DIABETES MELLITUS WITH HYPEROSMOLARITY WITHOUT COMA, WITHOUT LONG-TERM CURRENT USE OF INSULIN (MULTI): ICD-10-CM

## 2024-10-30 RX ORDER — BUDESONIDE, GLYCOPYRROLATE, AND FORMOTEROL FUMARATE 160; 9; 4.8 UG/1; UG/1; UG/1
2 AEROSOL, METERED RESPIRATORY (INHALATION) 2 TIMES DAILY
Qty: 32.1 G | Refills: 3 | Status: SHIPPED | OUTPATIENT
Start: 2024-10-30

## 2024-10-30 RX ORDER — TIRZEPATIDE 5 MG/.5ML
5 INJECTION, SOLUTION SUBCUTANEOUS WEEKLY
Qty: 6 ML | Refills: 0 | Status: SHIPPED | OUTPATIENT
Start: 2024-10-30

## 2024-10-31 ENCOUNTER — APPOINTMENT (OUTPATIENT)
Dept: GASTROENTEROLOGY | Facility: CLINIC | Age: 54
End: 2024-10-31
Payer: COMMERCIAL

## 2024-10-31 DIAGNOSIS — I20.89 ANGINA OF EFFORT (CMS-HCC): ICD-10-CM

## 2024-10-31 PROCEDURE — RXMED WILLOW AMBULATORY MEDICATION CHARGE

## 2024-10-31 RX ORDER — ALBUTEROL SULFATE 90 UG/1
2 INHALANT RESPIRATORY (INHALATION) EVERY 6 HOURS PRN
Qty: 54 G | Refills: 3 | Status: SHIPPED | OUTPATIENT
Start: 2024-10-31

## 2024-11-01 ENCOUNTER — PATIENT OUTREACH (OUTPATIENT)
Dept: HOME HEALTH SERVICES | Age: 54
End: 2024-11-01
Payer: COMMERCIAL

## 2024-11-04 ENCOUNTER — PATIENT OUTREACH (OUTPATIENT)
Dept: HOME HEALTH SERVICES | Age: 54
End: 2024-11-04
Payer: COMMERCIAL

## 2024-11-04 RX ORDER — ASPIRIN 81 MG/1
81 TABLET ORAL DAILY
Qty: 30 TABLET | Refills: 0 | Status: SHIPPED | OUTPATIENT
Start: 2024-11-04

## 2024-11-04 NOTE — PROGRESS NOTES
Spoke with patient she called in because she is out of her albuterol reached out to Jim to see if the refill can be moved to a pharmacy closer to home  no other medication needs she is aware which medications will come from [facility] that we approved PAP. She had no vitals or other information to report.

## 2024-11-05 ENCOUNTER — PHARMACY VISIT (OUTPATIENT)
Dept: PHARMACY | Facility: CLINIC | Age: 54
End: 2024-11-05
Payer: COMMERCIAL

## 2024-11-06 ENCOUNTER — APPOINTMENT (OUTPATIENT)
Dept: CARE COORDINATION | Age: 54
End: 2024-11-06
Payer: COMMERCIAL

## 2024-11-06 ENCOUNTER — PATIENT OUTREACH (OUTPATIENT)
Dept: HOME HEALTH SERVICES | Age: 54
End: 2024-11-06

## 2024-11-06 VITALS — BODY MASS INDEX: 36.58 KG/M2 | WEIGHT: 200 LBS

## 2024-11-06 PROCEDURE — RXMED WILLOW AMBULATORY MEDICATION CHARGE

## 2024-11-06 NOTE — PROGRESS NOTES
"Weekly hhvc completed with Dr. Mckeon, pt and RN. Pt states she is doing okay, had a \"little episode\" last night at work with her breathing. Pt states it started in her jaw and went down to the back of her throat and to her chest. Pt states once she got it under control she was okay. Pt denies having any other episodes this bad. Pt states she is waiting on her scripts to be delivered, she is out of albuterol. Pt was approved for PAP. States the new inhaler, mounjaro and jardiance should be delivered today, pt states her bgts have been okay, highest has been 252. Pt will go to get labs done on the 15th. PCP 11/11. Pt denies any further needs/questions/concerns at this time. Aware of upcoming appts. [facility] scheduled 11/13 @ 1300.    200lb    Patient's Address:   [address] [place] [id number]  **  If this is not the address patient will receive services - alert team and address in EMR**       Patient Contacts:  Extended Emergency Contact Information  Primary Emergency Contact: Hosea Mayorga  Address: [address] [place] [address] [place]  Home Phone: [phone]  Mobile Phone: [phone]  Relation: Child  Secondary Emergency Contact: Zuhair Sorto  Mobile Phone: [phone]  Relation: Daughter                                Patient's Preferred Phone: [phone]  Patient's E-mail: [email or web address]                                      "

## 2024-11-08 ENCOUNTER — PATIENT OUTREACH (OUTPATIENT)
Dept: CARE COORDINATION | Age: 54
End: 2024-11-08
Payer: COMMERCIAL

## 2024-11-08 ENCOUNTER — PHARMACY VISIT (OUTPATIENT)
Dept: PHARMACY | Facility: CLINIC | Age: 54
End: 2024-11-08
Payer: COMMERCIAL

## 2024-11-08 DIAGNOSIS — I10 PRIMARY HYPERTENSION: ICD-10-CM

## 2024-11-08 PROCEDURE — RXMED WILLOW AMBULATORY MEDICATION CHARGE

## 2024-11-08 RX ORDER — ATORVASTATIN CALCIUM 40 MG/1
40 TABLET, FILM COATED ORAL DAILY
Qty: 30 TABLET | Refills: 1 | Status: SHIPPED | OUTPATIENT
Start: 2024-11-08

## 2024-11-08 NOTE — PROGRESS NOTES
"Daily Call Note:   1340 - Daily call completed with pt today. She states that she feels ok, however, she was audibly SOB / anxious while talking.  Repeated the story about having a breathing \"episode\" at work - she reported to this nurse that she is using her rescue inhalers (told the original RN that she was out of it).  She has had no further episodes since that evening (Tuesday).  Pt reported that she feels like her \"oxygen goes down\" when she is in the shower. When this occurs, she exits the shower and goes to her room to sit down. When asked if anyone has ever suggested that these feelings may be due to anxiety, she states that she has \"severe anxiety.\" Not currently medicated for this.   Has appt with new PCP on Monday, 11/11 - strongly encouraged her to bring up her feelings at that appt. She verbalized understanding.  Confirmed that she also has respiratory / PFTs 11/15. Pt began hurriedly stating that she would refuse to allow anyone to draw ABGs, that she had a bad response in the past. Slowly explained to pt to think that this appt isn't for a whole week, there is nothing she can do about it until she gets there to discuss it with them. Encouraged her to distract herself if needed.  Next [facility] is 11/13 at 1300.  No other questions or concerns at this time.    Pt Education: POC  Barriers: anxiety  Topics for Daily Review: breathing; anxiety; appts;   Pt demonstrates clear understanding: Yes    Daily Weight:  There were no vitals filed for this visit.   Last 3 Weights:  Wt Readings from Last 7 Encounters:   11/06/24 90.7 kg (200 lb)   10/24/24 93.8 kg (206 lb 11.2 oz)   10/09/24 93.9 kg (207 lb 0.2 oz)   10/21/24 93.6 kg (206 lb 4.8 oz)   09/24/24 91.6 kg (202 lb)   08/21/24 95.3 kg (210 lb)   06/03/24 92.1 kg (203 lb)       Masimo Device: No   Masimo Clinical Impression: n/a    Virtual Visits--Scheduled (Most Recent Date at Top)  Follow up Appointments  Recent Visits  Date Type Provider Dept   11/06/24 " Telemedicine Karen Mckeon MD [facility]   Showing recent visits within past 30 days and meeting all other requirements  Future Appointments  Date Type Provider Dept   11/11/24 Appointment Taryn Dunn MD Do [place] [id number]   Showing future appointments within next 90 days and meeting all other requirements       Frequency of RN Calls & Virtual Visits per Team Agreement: [facility] at Home Frequency: MWF    Medication issues Addressed (what was done): none    Follow up appointments scheduled by [facility] Staff: none  Referrals made by [facility] staff: none

## 2024-11-11 ENCOUNTER — APPOINTMENT (OUTPATIENT)
Dept: PRIMARY CARE | Facility: CLINIC | Age: 54
End: 2024-11-11
Payer: COMMERCIAL

## 2024-11-11 ENCOUNTER — PATIENT OUTREACH (OUTPATIENT)
Dept: CARE COORDINATION | Age: 54
End: 2024-11-11

## 2024-11-11 DIAGNOSIS — Z59.71 INSURANCE COVERAGE PROBLEMS: ICD-10-CM

## 2024-11-11 NOTE — PROGRESS NOTES
"Daily Call Note:   1550 - Daily call completed with pt. Pt states that she is \"not very well\" today. Received a call from  that her insurance is no longer accepted and all appts have been cancelled.  She was instructed by that caller to contact her insurance carrier to see if there was another plan available to her in which  would be covered. Urgent referral entered for CHW.  Needs pantoprazole refilled - secure chat sent to [facility].  Next [facility] is 11/13 at 1300.  No other questions or concerns at this time.     Pt Education: POC  Barriers: insurance coverage  Topics for Daily Review: financial insecurities  Pt demonstrates clear understanding: Yes    Daily Weight:  There were no vitals filed for this visit.   Last 3 Weights:  Wt Readings from Last 7 Encounters:   11/06/24 90.7 kg (200 lb)   10/24/24 93.8 kg (206 lb 11.2 oz)   10/09/24 93.9 kg (207 lb 0.2 oz)   10/21/24 93.6 kg (206 lb 4.8 oz)   09/24/24 91.6 kg (202 lb)   08/21/24 95.3 kg (210 lb)   06/03/24 92.1 kg (203 lb)       Masimo Device: No   Masimo Clinical Impression: n/a    Virtual Visits--Scheduled (Most Recent Date at Top)  Follow up Appointments  Recent Visits  No visits were found meeting these conditions.  Showing recent visits within past 30 days and meeting all other requirements  Today's Visits  Date Type Provider Dept   11/11/24 Appointment Taryn Dunn MD Do [place] [address] [facility]   Showing today's visits and meeting all other requirements  Future Appointments  No visits were found meeting these conditions.  Showing future appointments within next 90 days and meeting all other requirements       Frequency of RN Calls & Virtual Visits per Team Agreement: Healthy at Home Frequency: MWF    Medication issues Addressed (what was done): refill needed for pantoprazole    Follow up appointments scheduled by [facility] Staff: none  Referrals made by [facility] staff: none        "

## 2024-11-12 ENCOUNTER — DOCUMENTATION (OUTPATIENT)
Dept: CARE COORDINATION | Age: 54
End: 2024-11-12
Payer: MEDICARE

## 2024-11-12 DIAGNOSIS — J45.20 MILD INTERMITTENT ASTHMA WITHOUT COMPLICATION (HHS-HCC): ICD-10-CM

## 2024-11-12 DIAGNOSIS — E11.00 TYPE 2 DIABETES MELLITUS WITH HYPEROSMOLARITY WITHOUT COMA, WITHOUT LONG-TERM CURRENT USE OF INSULIN (MULTI): Primary | ICD-10-CM

## 2024-11-12 SDOH — HEALTH STABILITY: MENTAL HEALTH: HOW OFTEN DO YOU HAVE SIX OR MORE DRINKS ON ONE OCCASION?: NEVER

## 2024-11-12 SDOH — ECONOMIC STABILITY: GENERAL: IN THE PAST 12 MONTHS HAS THE ELECTRIC, GAS, OIL, OR WATER COMPANY THREATENED TO SHUT OFF SERVICES IN YOUR HOME?: NO

## 2024-11-12 SDOH — HEALTH STABILITY: MENTAL HEALTH: ON AVERAGE, HOW MANY MINUTES DO YOU ENGAGE IN EXERCISE AT THIS LEVEL?: 40 MIN

## 2024-11-12 SDOH — ECONOMIC STABILITY: HOUSING INSECURITY: AT ANY TIME IN THE PAST 12 MONTHS, WERE YOU HOMELESS OR LIVING IN A SHELTER (INCLUDING NOW)?: NO

## 2024-11-12 SDOH — ECONOMIC STABILITY: GENERAL: HOW HARD IS IT FOR YOU TO PAY FOR THE VERY BASICS LIKE FOOD, HOUSING, MEDICAL CARE, AND HEATING?: NOT VERY HARD

## 2024-11-12 SDOH — ECONOMIC STABILITY: FOOD INSECURITY: WITHIN THE PAST 12 MONTHS, YOU WORRIED THAT YOUR FOOD WOULD RUN OUT BEFORE YOU GOT THE MONEY TO BUY MORE.: OFTEN TRUE

## 2024-11-12 SDOH — SOCIAL STABILITY: SOCIAL INSECURITY: WITHIN THE LAST YEAR, HAVE YOU BEEN HUMILIATED OR EMOTIONALLY ABUSED IN OTHER WAYS BY YOUR PARTNER OR EX-PARTNER?: NO

## 2024-11-12 SDOH — ECONOMIC STABILITY: HOUSING INSECURITY: IN THE LAST 12 MONTHS, WAS THERE A TIME WHEN YOU WERE NOT ABLE TO PAY THE MORTGAGE OR RENT ON TIME?: NO

## 2024-11-12 SDOH — HEALTH STABILITY: MENTAL HEALTH: HOW MANY DRINKS CONTAINING ALCOHOL DO YOU HAVE ON A TYPICAL DAY WHEN YOU ARE DRINKING?: PATIENT DOES NOT DRINK

## 2024-11-12 SDOH — ECONOMIC STABILITY: FOOD INSECURITY: WITHIN THE PAST 12 MONTHS, THE FOOD YOU BOUGHT JUST DIDN'T LAST AND YOU DIDN'T HAVE MONEY TO GET MORE.: OFTEN TRUE

## 2024-11-12 SDOH — ECONOMIC STABILITY: TRANSPORTATION INSECURITY: IN THE PAST 12 MONTHS, HAS LACK OF TRANSPORTATION KEPT YOU FROM MEDICAL APPOINTMENTS OR FROM GETTING MEDICATIONS?: NO

## 2024-11-12 SDOH — SOCIAL STABILITY: SOCIAL NETWORK: HOW OFTEN DO YOU ATTEND CHURCH OR RELIGIOUS SERVICES?: MORE THAN 4 TIMES PER YEAR

## 2024-11-12 SDOH — HEALTH STABILITY: MENTAL HEALTH: HOW OFTEN DO YOU HAVE A DRINK CONTAINING ALCOHOL?: NEVER

## 2024-11-12 SDOH — HEALTH STABILITY: MENTAL HEALTH
DO YOU FEEL STRESS - TENSE, RESTLESS, NERVOUS, OR ANXIOUS, OR UNABLE TO SLEEP AT NIGHT BECAUSE YOUR MIND IS TROUBLED ALL THE TIME - THESE DAYS?: ONLY A LITTLE

## 2024-11-12 SDOH — SOCIAL STABILITY: SOCIAL INSECURITY: WITHIN THE LAST YEAR, HAVE YOU BEEN AFRAID OF YOUR PARTNER OR EX-PARTNER?: NO

## 2024-11-12 SDOH — SOCIAL STABILITY: SOCIAL NETWORK: HOW OFTEN DO YOU GET TOGETHER WITH FRIENDS OR RELATIVES?: MORE THAN THREE TIMES A WEEK

## 2024-11-12 SDOH — SOCIAL STABILITY: SOCIAL INSECURITY: ARE YOU MARRIED, WIDOWED, DIVORCED, SEPARATED, NEVER MARRIED, OR LIVING WITH A PARTNER?: NEVER MARRIED

## 2024-11-12 SDOH — SOCIAL STABILITY: SOCIAL NETWORK: HOW OFTEN DO YOU ATTEND MEETINGS OF THE CLUBS OR ORGANIZATIONS YOU BELONG TO?: NEVER

## 2024-11-12 SDOH — HEALTH STABILITY: PHYSICAL HEALTH: ON AVERAGE, HOW MANY DAYS PER WEEK DO YOU ENGAGE IN MODERATE TO STRENUOUS EXERCISE (LIKE A BRISK WALK)?: 7 DAYS

## 2024-11-12 ASSESSMENT — LIFESTYLE VARIABLES
SKIP TO QUESTIONS 9-10: 1
AUDIT-C TOTAL SCORE: 0

## 2024-11-12 ASSESSMENT — ACTIVITIES OF DAILY LIVING (ADL): LACK_OF_TRANSPORTATION: NO

## 2024-11-13 ENCOUNTER — DOCUMENTATION (OUTPATIENT)
Dept: CARE COORDINATION | Age: 54
End: 2024-11-13

## 2024-11-13 ENCOUNTER — PATIENT OUTREACH (OUTPATIENT)
Dept: CARE COORDINATION | Age: 54
End: 2024-11-13

## 2024-11-13 ENCOUNTER — PATIENT OUTREACH (OUTPATIENT)
Dept: HOME HEALTH SERVICES | Age: 54
End: 2024-11-13

## 2024-11-13 ENCOUNTER — TELEMEDICINE (OUTPATIENT)
Dept: PHARMACY | Facility: HOSPITAL | Age: 54
End: 2024-11-13
Payer: MEDICARE

## 2024-11-13 ENCOUNTER — APPOINTMENT (OUTPATIENT)
Dept: CARE COORDINATION | Age: 54
End: 2024-11-13
Payer: COMMERCIAL

## 2024-11-13 DIAGNOSIS — E11.00 TYPE 2 DIABETES MELLITUS WITH HYPEROSMOLARITY WITHOUT COMA, WITHOUT LONG-TERM CURRENT USE OF INSULIN (MULTI): ICD-10-CM

## 2024-11-13 DIAGNOSIS — J45.20 MILD INTERMITTENT ASTHMA WITHOUT COMPLICATION (HHS-HCC): ICD-10-CM

## 2024-11-13 NOTE — PROGRESS NOTES
CHW emailed [place] food pantries.  [facility] [employer] [facility]  [email or web address]  [facility]  [email or web address]  [employer]  [address]  [facility], [address] See [place] Map  [phone]  [email or web address]  Community Resource Name: [employer]  Phone Number: [phone]  Staff Member:  Deanne Dutton    Discussed the following topics on behalf of the patient:  []  Behavioral Health Assistance     []  Case Management  []   Assistance  []  Digital Equity Assistance  []  Dental Health Assistance  []  Education Assistance  []  Employment Assistance  []  Financial Strain Relief Assistance  [x]  Food Insecurity Assistance  []  Healthcare Coverage Assistance  []  Housing Stability Assistance  []  IP Violence Relief Assistance  []  Legal Assistance  []  Physical Activity Assistance  []  Social Connection Assistance  []  Stress Relief Assistance   []  Substance Abuse Assistance  []  Transportation Assistance  []  Utility Assistance  []  Other: [insert comment here]    Next Steps:         Deanne Dutton       
No

## 2024-11-13 NOTE — PROGRESS NOTES
Healthy at Home Virtual Clinic    Vannessa Soto is a 54 y.o. female was referred to Clinical Pharmacy Team to complete a post-discharge medication optimization and monitoring visit.  The patient was referred for COPD management while in [facility]. Today was our fifth visit.       Referring Provider: Cayden Garcia DO  PCP: Taryn Dunn MD       Subjective   No Known Allergies    [id number] - [place], [place] - [address] AT NEC OF [address] [facility] [address] [facility]  [address] [address]  Phone: [phone] Fax: [phone]    [facility], [place] - [address]  [address] [facility] [address]  Phone: [phone] Fax: [phone]    [id number] - [place], [place] - [address]  [address] [facility] [address]  Phone: [phone] Fax: [phone]    [facility]  [address]  [facility] [address]  Phone: [phone] Fax: [phone]    [facility]  [address], [address]  [facility] [address]  Phone: [phone] Fax: [phone]      Medication System Management:  Affordability/Accessibility: approved for PAP  Adherence/Organization: no issues       Social History     Social History Narrative    Not on file          HPI  PULMONARY ASSESSMENT  Patient has been diagnosed with: COPD  does not see a pulmonologist  Last visit: n/a     Current Regimen:  Albuterol      Clarifications to above regimen: none   Adverse Effects: none   Appropriate technique? Yes     Historical Treatment  Has not been on any maintenance inhalers in the past      How often do you use your rescue inhaler? Daily      Symptom Assessment:  Current symptoms: dyspnea, cough, and fatigue  Symptoms are remain unchanged  Triggers: exertion  Alleviating factors: rest, rescue inhaler      Exacerbation Hx:  When was your last hospitalization for an exacerbation? This past admission  When was the last time you were  treated with antibiotics and/or steroids? This past admission       Immunization History:  No history      Smoking history:  She has a history of 17.5 pack years. She quit smoking approximately 1 week ago.     Review of Systems        Objective     There were no vitals taken for this visit.   BP Readings from Last 4 Encounters:   10/24/24 122/80   10/11/24 110/56   10/21/24 157/87   09/24/24 172/89      There were no vitals filed for this visit.     LAB  Lab Results   Component Value Date    BILITOT 0.5 10/09/2024    CALCIUM 9.3 10/11/2024    CO2 27 10/11/2024     10/11/2024    CREATININE 0.72 10/11/2024    GLUCOSE 182 (H) 10/11/2024    ALKPHOS 88 10/09/2024    K 4.0 10/11/2024    PROT 6.3 (L) 10/09/2024     10/11/2024    AST 13 10/09/2024    ALT 17 10/09/2024    BUN 22 10/11/2024    ANIONGAP 10 10/11/2024    MG 1.92 10/10/2024    ALBUMIN 4.1 10/09/2024    GFRF 77 05/01/2023     Lab Results   Component Value Date    TRIG 142 10/09/2024    CHOL 193 10/09/2024    LDLCALC 130 (H) 10/09/2024    HDL 35.0 10/09/2024     Lab Results   Component Value Date    HGBA1C 7.8 (H) 10/09/2024         Current Outpatient Medications   Medication Instructions    albuterol (Ventolin HFA) 90 mcg/actuation inhaler 2 puffs, inhalation, Every 6 hours PRN    albuterol 90 mcg/actuation inhaler 2 puffs, inhalation, Every 6 hours PRN    aspirin 81 mg, oral, Daily    atorvastatin (LIPITOR) 40 mg, oral, Daily    blood pressure monitor kit Use as directed    brompheniramine-pseudoeph-DM 2-30-10 mg/5 mL syrup     budesonide-glycopyr-formoterol (Breztri Aerosphere) 160-9-4.8 mcg/actuation HFA aerosol inhaler 2 puffs, inhalation, 2 times daily    Jardiance 25 mg, oral, Daily    montelukast (SINGULAIR) 10 mg, oral, Nightly    Mounjaro 5 mg, subcutaneous, Weekly    nicotine (Nicoderm CQ) 21 mg/24 hr patch 1 patch, transdermal, Daily    pantoprazole (PROTONIX) 40 mg, oral, Daily before breakfast, Do not crush, chew, or split.           Assessment/Plan   Problem List Items Addressed This Visit       Type 2 diabetes mellitus with hyperosmolarity without coma, without long-term current use of insulin (Multi) (Chronic)    Mild intermittent asthma without complication (HHS-HCC)       DM  Most recent A1c was 7.8% while admitted   She has a dexcom to monitor her sugars continuously   Usually ranges around the mid-100's  Fasting today was 128  No hypo events either   Respiratory   Does not wear oxygen   Breathing is okay, but still getting SOB with exertion   Also still getting the chest pain/sensations intermittently but it is better compared to when she was in the hospital   Recently saw her pulmonologist and wants her to do some additional tests    Overall, she has been doing well, however I guess there is issues with her insurance and  not accepting it anymore so she is not able to go to her follow up appointments.     Medications Changes/Concerns:  -none      Refills Needed:  -none needed today      Plan/To Do:  -nursing to look into the insurance issue and get back to her  -continue to monitor vitals and sugars  -nursing to continue with calls        PAP Status:  -she has been approved!       Time Spent with Patient and [facility] Team - Dr. Garcia and Johana, RN via phone call: 30 minutes      Follow Up: 1 week     Continue all meds under the continuation of care with the referring provider and clinical pharmacy team.    Jim Botello PharmD     Verbal consent to manage patient's drug therapy was obtained from the patient. They were informed they may decline to participate or withdraw from participation in pharmacy services at any time.

## 2024-11-13 NOTE — PROGRESS NOTES
[facility] Call Note:   Spoke to patient, reports feels alright. States on 11/11 she received a call from someone from  that stated  doesn't accept her insurance anymore. She did not have appointment Monday with PCP.   She has sleep/pulm apt 11/15 still in [facility].   Reached out to clarify.  Reports BG, denies further concerns.     Pt Education: POC  Barriers: na  Topics for Daily Review: POC  Pt demonstrates clear understanding: Yes    Daily Weight:  There were no vitals filed for this visit.   Last 3 Weights:  Wt Readings from Last 7 Encounters:   11/06/24 90.7 kg (200 lb)   10/24/24 93.8 kg (206 lb 11.2 oz)   10/09/24 93.9 kg (207 lb 0.2 oz)   10/21/24 93.6 kg (206 lb 4.8 oz)   09/24/24 91.6 kg (202 lb)   08/21/24 95.3 kg (210 lb)   06/03/24 92.1 kg (203 lb)       Masimo Device: No   Masimo Clinical Impression: na    Virtual Visits--Scheduled (Most Recent Date at Top)  Follow up Appointments  Recent Visits  Date Type Provider Dept   11/11/24 Appointment Taryn Dunn MD [id number] [facility]   Showing recent visits within past 30 days and meeting all other requirements  Future Appointments  No visits were found meeting these conditions.  Showing future appointments within next 90 days and meeting all other requirements       Frequency of RN Calls & Virtual Visits per Team Agreement: Healthy at Home Frequency: Daily    Medication issues Addressed (what was done): na    Follow up appointments scheduled by [facility] Staff: na  Referrals made by [facility] staff: marcela

## 2024-11-13 NOTE — PROGRESS NOTES
Phoned patient back to inform her of clarification of insurance coverage.  does not accept her [facility] marketplace, per , she needs to receive her care at [facility] to be covered.   Notified patient of this and advised her she needs to schedule appointments at [facility]. Also informed her to call her insurance to see if there is anywhere else they would approve.   PAP still approved, she will continue to receive no cost meds. She has [employer] information as well with any issues.   Patient graduated program.

## 2024-11-15 ENCOUNTER — PHARMACY VISIT (OUTPATIENT)
Dept: PHARMACY | Facility: CLINIC | Age: 54
End: 2024-11-15
Payer: COMMERCIAL

## 2024-11-22 ENCOUNTER — PATIENT OUTREACH (OUTPATIENT)
Dept: CARDIOLOGY | Facility: CLINIC | Age: 54
End: 2024-11-22
Payer: MEDICARE

## 2024-11-25 ENCOUNTER — APPOINTMENT (OUTPATIENT)
Dept: RADIOLOGY | Facility: HOSPITAL | Age: 54
End: 2024-11-25
Payer: MEDICARE

## 2024-12-03 ENCOUNTER — OFFICE VISIT (OUTPATIENT)
Dept: PRIMARY CARE | Facility: CLINIC | Age: 54
End: 2024-12-03
Payer: MEDICARE

## 2024-12-03 VITALS
TEMPERATURE: 97.4 F | WEIGHT: 202 LBS | DIASTOLIC BLOOD PRESSURE: 76 MMHG | BODY MASS INDEX: 38.14 KG/M2 | SYSTOLIC BLOOD PRESSURE: 124 MMHG | HEIGHT: 61 IN | HEART RATE: 64 BPM

## 2024-12-03 DIAGNOSIS — E78.5 HYPERLIPIDEMIA, UNSPECIFIED HYPERLIPIDEMIA TYPE: ICD-10-CM

## 2024-12-03 DIAGNOSIS — E11.00 TYPE 2 DIABETES MELLITUS WITH HYPEROSMOLARITY WITHOUT COMA, WITHOUT LONG-TERM CURRENT USE OF INSULIN (MULTI): Chronic | ICD-10-CM

## 2024-12-03 DIAGNOSIS — E78.00 HYPERCHOLESTEROLEMIA: ICD-10-CM

## 2024-12-03 DIAGNOSIS — J42 CHRONIC BRONCHITIS, UNSPECIFIED CHRONIC BRONCHITIS TYPE (MULTI): ICD-10-CM

## 2024-12-03 DIAGNOSIS — Z12.31 ENCOUNTER FOR SCREENING MAMMOGRAM FOR MALIGNANT NEOPLASM OF BREAST: ICD-10-CM

## 2024-12-03 DIAGNOSIS — E78.2 MIXED HYPERLIPIDEMIA: ICD-10-CM

## 2024-12-03 DIAGNOSIS — I10 PRIMARY HYPERTENSION: ICD-10-CM

## 2024-12-03 DIAGNOSIS — J44.9 CHRONIC OBSTRUCTIVE PULMONARY DISEASE, UNSPECIFIED COPD TYPE (MULTI): Primary | ICD-10-CM

## 2024-12-03 PROCEDURE — 3078F DIAST BP <80 MM HG: CPT | Performed by: INTERNAL MEDICINE

## 2024-12-03 PROCEDURE — 3074F SYST BP LT 130 MM HG: CPT | Performed by: INTERNAL MEDICINE

## 2024-12-03 PROCEDURE — 99214 OFFICE O/P EST MOD 30 MIN: CPT | Performed by: INTERNAL MEDICINE

## 2024-12-03 PROCEDURE — 3008F BODY MASS INDEX DOCD: CPT | Performed by: INTERNAL MEDICINE

## 2024-12-03 PROCEDURE — 1036F TOBACCO NON-USER: CPT | Performed by: INTERNAL MEDICINE

## 2024-12-03 PROCEDURE — 3051F HG A1C>EQUAL 7.0%<8.0%: CPT | Performed by: INTERNAL MEDICINE

## 2024-12-03 PROCEDURE — 3050F LDL-C >= 130 MG/DL: CPT | Performed by: INTERNAL MEDICINE

## 2024-12-03 RX ORDER — MONTELUKAST SODIUM 10 MG/1
10 TABLET ORAL NIGHTLY
Qty: 90 TABLET | Refills: 1 | Status: SHIPPED | OUTPATIENT
Start: 2024-12-03 | End: 2025-06-01

## 2024-12-03 ASSESSMENT — PATIENT HEALTH QUESTIONNAIRE - PHQ9
SUM OF ALL RESPONSES TO PHQ9 QUESTIONS 1 & 2: 0
1. LITTLE INTEREST OR PLEASURE IN DOING THINGS: NOT AT ALL
2. FEELING DOWN, DEPRESSED OR HOPELESS: NOT AT ALL

## 2024-12-03 ASSESSMENT — ENCOUNTER SYMPTOMS
GASTROINTESTINAL NEGATIVE: 1
CONSTITUTIONAL NEGATIVE: 1
NEUROLOGICAL NEGATIVE: 1
EYES NEGATIVE: 1

## 2024-12-03 NOTE — PROGRESS NOTES
"Subjective   Patient ID: Vannessa Soto is a 54 y.o. female who presents for Establish Care (Pt here to establish).    HPI patient here to get established she was in the hospital in October she thought she was having MIH she had chest pains she had a stress test which was negative she has seen cardiology she is also seeing Dr. Moya for pulmonary and is on inhalers she does have a history of COPD she has history of lung nodules and is scheduled for CAT scan of the lungs she is in the process of trying to cut down on smoking and stop it she is on Mounjaro and Jardiance for diabetes she also has a CGM.    Review of Systems   Constitutional: Negative.    HENT: Negative.     Eyes: Negative.    Respiratory:          Copd   Gastrointestinal: Negative.    Genitourinary: Negative.    Neurological: Negative.    All other systems reviewed and are negative.      Objective   /76   Pulse 64   Temp 36.3 °C (97.4 °F) (Temporal)   Ht 1.549 m (5' 1\")   Wt 91.6 kg (202 lb)   BMI 38.17 kg/m²     Physical Exam  Vitals reviewed.   Constitutional:       Appearance: Normal appearance. She is obese.   Cardiovascular:      Rate and Rhythm: Normal rate and regular rhythm.      Heart sounds: Normal heart sounds.   Pulmonary:      Effort: Pulmonary effort is normal.      Breath sounds: Normal breath sounds.   Neurological:      General: No focal deficit present.      Mental Status: She is alert and oriented to person, place, and time.      Cranial Nerves: No cranial nerve deficit.         Assessment/Plan   Problem List Items Addressed This Visit             ICD-10-CM    Type 2 diabetes mellitus with hyperosmolarity without coma, without long-term current use of insulin (Multi) (Chronic) E11.00    Relevant Orders    Lipid Panel    Comprehensive Metabolic Panel    Hemoglobin A1C    Primary hypertension I10    Hypercholesterolemia E78.00    Hyperlipidemia E78.5    Relevant Orders    Cholesterol, LDL Direct    Lipid Panel    " Comprehensive Metabolic Panel    Chronic bronchitis (Multi) J42     Other Visit Diagnoses         Codes    Chronic obstructive pulmonary disease, unspecified COPD type (Multi)    -  Primary J44.9    Relevant Medications    montelukast (Singulair) 10 mg tablet    Encounter for screening mammogram for malignant neoplasm of breast     Z12.31    Relevant Orders    BI mammo bilateral screening tomosynthesis        We reviewed her data from EMR we advised and discussed smoking cessation she will continue her inhalers for COPD she is scheduled for PFTs 6-minute walk test and CAT scan of the lungs per Dr. Moya.  She was also advised about colonoscopy and mammograms for cancer screening.  Follow-up with us in few weeks.  We advised her to follow diabetic diet A1c is 7.8 she is on Mounjaro and that has helped her to lose weight then she is also on Jardiance for diabetes.

## 2024-12-17 ENCOUNTER — APPOINTMENT (OUTPATIENT)
Dept: RADIOLOGY | Facility: HOSPITAL | Age: 54
End: 2024-12-17
Payer: MEDICARE

## 2024-12-19 ENCOUNTER — APPOINTMENT (OUTPATIENT)
Dept: RESPIRATORY THERAPY | Facility: HOSPITAL | Age: 54
End: 2024-12-19
Payer: MEDICARE

## 2024-12-20 ENCOUNTER — PATIENT OUTREACH (OUTPATIENT)
Dept: PRIMARY CARE | Facility: CLINIC | Age: 54
End: 2024-12-20
Payer: MEDICARE
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
## 2024-12-31 PROBLEM — S80.00XA CONTUSION OF KNEE: Status: ACTIVE | Noted: 2024-12-31

## 2024-12-31 PROBLEM — G56.00 CARPAL TUNNEL SYNDROME: Status: ACTIVE | Noted: 2024-12-31

## 2025-01-08 ENCOUNTER — DOCUMENTATION (OUTPATIENT)
Dept: PRIMARY CARE | Facility: CLINIC | Age: 55
End: 2025-01-08

## 2025-01-08 NOTE — PROGRESS NOTES
Patient has met target of no readmission for (90) days post discharge and is graduated from Transitional Care Management program at this time.

## 2025-01-20 PROCEDURE — RXMED WILLOW AMBULATORY MEDICATION CHARGE

## 2025-01-21 DIAGNOSIS — E11.00 TYPE 2 DIABETES MELLITUS WITH HYPEROSMOLARITY WITHOUT COMA, WITHOUT LONG-TERM CURRENT USE OF INSULIN (MULTI): ICD-10-CM

## 2025-01-21 RX ORDER — TIRZEPATIDE 5 MG/.5ML
5 INJECTION, SOLUTION SUBCUTANEOUS WEEKLY
Qty: 6 ML | Refills: 3 | Status: SHIPPED | OUTPATIENT
Start: 2025-01-21

## 2025-01-22 ENCOUNTER — PHARMACY VISIT (OUTPATIENT)
Dept: PHARMACY | Facility: CLINIC | Age: 55
End: 2025-01-22
Payer: COMMERCIAL

## 2025-01-28 DIAGNOSIS — J45.20 MILD INTERMITTENT ASTHMA WITHOUT COMPLICATION (HHS-HCC): ICD-10-CM

## 2025-01-28 DIAGNOSIS — I10 PRIMARY HYPERTENSION: ICD-10-CM

## 2025-01-28 DIAGNOSIS — E11.00 TYPE 2 DIABETES MELLITUS WITH HYPEROSMOLARITY WITHOUT COMA, WITHOUT LONG-TERM CURRENT USE OF INSULIN (MULTI): Primary | ICD-10-CM

## 2025-01-28 PROCEDURE — RXMED WILLOW AMBULATORY MEDICATION CHARGE

## 2025-01-29 ENCOUNTER — TELEMEDICINE (OUTPATIENT)
Dept: PHARMACY | Facility: HOSPITAL | Age: 55
End: 2025-01-29

## 2025-01-29 DIAGNOSIS — J45.20 MILD INTERMITTENT ASTHMA WITHOUT COMPLICATION (HHS-HCC): ICD-10-CM

## 2025-01-29 DIAGNOSIS — I10 PRIMARY HYPERTENSION: ICD-10-CM

## 2025-01-29 DIAGNOSIS — E11.00 TYPE 2 DIABETES MELLITUS WITH HYPEROSMOLARITY WITHOUT COMA, WITHOUT LONG-TERM CURRENT USE OF INSULIN (MULTI): ICD-10-CM

## 2025-01-29 NOTE — PROGRESS NOTES
Clinical Pharmacy Visit    Vannessa Soto is a 55 y.o. female.  The patient was referred for their COPD and diabetes management.      Referring Provider: Atul Zhang MD  PCP: Taryn Dunn MD - last visit: 12/3/24, next visit: 3/4      Subjective   No Known Allergies    [id number] - [place], [place] - [address] AT [address] OF [address] [facility] [address]  [address] [address]  Phone: [phone] Fax: [phone]    [facility] [place] - [address]  [address] [facility] [address]  Phone: [phone] Fax: [phone]    [id number] - [place], [place] - [address]  [address] [address]  Phone: [phone] Fax: [phone]    [facility]  [address]  [facility] [address]  Phone: [phone] Fax: [phone]    [facility]  [address]  [facility] [address]  Phone: [phone] Fax: [phone]      Medication System Management:  Affordability/Accessibility: approved for PAP  Adherence/Organization: no issues       Social History     Social History Narrative    Not on file          HPI  PULMONARY ASSESSMENT  Patient has been diagnosed with: COPD  does not see a pulmonologist  Last visit: n/a     Current Regimen:  Breztri   Albuterol      Clarifications to above regimen: none   Adverse Effects: none   Appropriate technique? Yes     Historical Treatment  Has not been on any maintenance inhalers in the past      How often do you use your rescue inhaler? Daily      Symptom Assessment:  Current symptoms: dyspnea, cough, and fatigue  Symptoms are remain unchanged  Triggers: exertion  Alleviating factors: rest, rescue inhaler      Exacerbation Hx:  When was your last hospitalization for an exacerbation? This past admission  When was the last time you were treated with antibiotics and/or steroids? This past admission       Immunization History:  No  history      Smoking history:  She has a history of 17.5 pack years. She quit smoking approximately 1 week ago.     Review of Systems        Objective     There were no vitals taken for this visit.   BP Readings from Last 4 Encounters:   12/03/24 124/76   10/24/24 122/80   10/11/24 110/56   10/21/24 157/87      There were no vitals filed for this visit.     LAB  Lab Results   Component Value Date    BILITOT 0.5 10/09/2024    CALCIUM 9.3 10/11/2024    CO2 27 10/11/2024     10/11/2024    CREATININE 0.72 10/11/2024    GLUCOSE 182 (H) 10/11/2024    ALKPHOS 88 10/09/2024    K 4.0 10/11/2024    PROT 6.3 (L) 10/09/2024     10/11/2024    AST 13 10/09/2024    ALT 17 10/09/2024    BUN 22 10/11/2024    ANIONGAP 10 10/11/2024    MG 1.92 10/10/2024    ALBUMIN 4.1 10/09/2024    GFRF 77 05/01/2023     Lab Results   Component Value Date    TRIG 142 10/09/2024    CHOL 193 10/09/2024    LDLCALC 130 (H) 10/09/2024    HDL 35.0 10/09/2024     Lab Results   Component Value Date    HGBA1C 7.8 (H) 10/09/2024         Current Outpatient Medications   Medication Instructions    albuterol 90 mcg/actuation inhaler 2 puffs, inhalation, Every 6 hours PRN    aspirin 81 mg, oral, Daily    atorvastatin (LIPITOR) 40 mg, oral, Daily    blood pressure monitor kit Use as directed    brompheniramine-pseudoeph-DM 2-30-10 mg/5 mL syrup     budesonide-glycopyr-formoterol (Breztri Aerosphere) 160-9-4.8 mcg/actuation HFA aerosol inhaler 2 puffs, inhalation, 2 times daily    Jardiance 25 mg, oral, Daily    montelukast (SINGULAIR) 10 mg, oral, Nightly    montelukast (SINGULAIR) 10 mg, oral, Nightly    Mounjaro 5 mg, subcutaneous, Weekly    nicotine (Nicoderm CQ) 21 mg/24 hr patch 1 patch, transdermal, Daily    pantoprazole (PROTONIX) 40 mg, oral, Daily before breakfast, Do not crush, chew, or split.            Assessment/Plan   Problem List Items Addressed This Visit       Type 2 diabetes mellitus with hyperosmolarity without coma, without long-term  current use of insulin (Multi) (Chronic)    Mild intermittent asthma without complication ([facility]-H[facility])    Primary hypertension     DM  Most recent A1c was 7.8% from 10/9/24  She has a dexcom to monitor her sugars continuously   Usually ranges around the mid-100's  No hypo events either  Continue jardiance 25mg daily    Due to insurance will need to switch to Ozempic once weekly --> will start 0.25mg dose for first 4 weeks then will increase to 0.5mg once weekly   Respiratory   Does not wear oxygen   Breathing is okay, but still getting SOB with exertion   Also still getting the chest pain/sensations intermittently but it is better compared to when she was in the hospital   Recently saw her pulmonologist and wants her to do some additional tests  Will continue with Breztri twice daily for maintenance (triple therapy)  Albuterol as needed     PAP:  -she is an approved patient!  -will add Ozempic     Follow Up: 3 months    Continue all meds under the continuation of care with the referring provider and clinical pharmacy team.    Jim Padilla, PharmD     Verbal consent to manage patient's drug therapy was obtained from the patient. They were informed they may decline to participate or withdraw from participation in pharmacy services at any time.

## 2025-01-30 ENCOUNTER — PHARMACY VISIT (OUTPATIENT)
Dept: PHARMACY | Facility: CLINIC | Age: 55
End: 2025-01-30
Payer: COMMERCIAL

## 2025-01-30 DIAGNOSIS — E11.00 TYPE 2 DIABETES MELLITUS WITH HYPEROSMOLARITY WITHOUT COMA, WITHOUT LONG-TERM CURRENT USE OF INSULIN (MULTI): ICD-10-CM

## 2025-02-02 RX ORDER — TIRZEPATIDE 5 MG/.5ML
5 INJECTION, SOLUTION SUBCUTANEOUS WEEKLY
Qty: 6 ML | Refills: 0 | OUTPATIENT
Start: 2025-02-02

## 2025-02-06 PROCEDURE — RXMED WILLOW AMBULATORY MEDICATION CHARGE

## 2025-02-07 DIAGNOSIS — J44.9 CHRONIC OBSTRUCTIVE PULMONARY DISEASE, UNSPECIFIED COPD TYPE (MULTI): ICD-10-CM

## 2025-02-10 RX ORDER — MONTELUKAST SODIUM 10 MG/1
10 TABLET ORAL NIGHTLY
Qty: 30 TABLET | Refills: 1 | Status: SHIPPED | OUTPATIENT
Start: 2025-02-10

## 2025-02-12 ENCOUNTER — PHARMACY VISIT (OUTPATIENT)
Dept: PHARMACY | Facility: CLINIC | Age: 55
End: 2025-02-12
Payer: COMMERCIAL

## 2025-02-18 ENCOUNTER — APPOINTMENT (OUTPATIENT)
Dept: GASTROENTEROLOGY | Facility: CLINIC | Age: 55
End: 2025-02-18
Payer: COMMERCIAL

## 2025-03-03 ENCOUNTER — TELEPHONE (OUTPATIENT)
Dept: PRIMARY CARE | Facility: CLINIC | Age: 55
End: 2025-03-03
Payer: COMMERCIAL

## 2025-03-04 ENCOUNTER — APPOINTMENT (OUTPATIENT)
Dept: PRIMARY CARE | Facility: CLINIC | Age: 55
End: 2025-03-04
Payer: MEDICARE

## 2025-04-17 DIAGNOSIS — E11.00 TYPE 2 DIABETES MELLITUS WITH HYPEROSMOLARITY WITHOUT COMA, WITHOUT LONG-TERM CURRENT USE OF INSULIN (MULTI): Primary | ICD-10-CM

## 2025-04-17 PROCEDURE — RXMED WILLOW AMBULATORY MEDICATION CHARGE

## 2025-04-17 RX ORDER — SEMAGLUTIDE 0.68 MG/ML
INJECTION, SOLUTION SUBCUTANEOUS
Qty: 6 ML | Refills: 3 | Status: SHIPPED | OUTPATIENT
Start: 2025-04-17

## 2025-04-19 ENCOUNTER — PHARMACY VISIT (OUTPATIENT)
Dept: PHARMACY | Facility: CLINIC | Age: 55
End: 2025-04-19
Payer: COMMERCIAL

## 2025-04-21 ENCOUNTER — PHARMACY VISIT (OUTPATIENT)
Dept: PHARMACY | Facility: CLINIC | Age: 55
End: 2025-04-21
Payer: COMMERCIAL

## 2025-04-23 ENCOUNTER — APPOINTMENT (OUTPATIENT)
Dept: PHARMACY | Facility: HOSPITAL | Age: 55
End: 2025-04-23

## 2025-04-30 PROCEDURE — RXMED WILLOW AMBULATORY MEDICATION CHARGE

## 2025-05-05 ENCOUNTER — PHARMACY VISIT (OUTPATIENT)
Dept: PHARMACY | Facility: CLINIC | Age: 55
End: 2025-05-05
Payer: COMMERCIAL

## 2025-05-07 PROCEDURE — RXMED WILLOW AMBULATORY MEDICATION CHARGE

## 2025-05-09 ENCOUNTER — PHARMACY VISIT (OUTPATIENT)
Dept: PHARMACY | Facility: CLINIC | Age: 55
End: 2025-05-09
Payer: COMMERCIAL

## 2025-06-11 ENCOUNTER — APPOINTMENT (OUTPATIENT)
Dept: PHARMACY | Facility: HOSPITAL | Age: 55
End: 2025-06-11
Payer: COMMERCIAL

## 2025-06-11 DIAGNOSIS — I10 PRIMARY HYPERTENSION: ICD-10-CM

## 2025-06-11 DIAGNOSIS — J44.9 CHRONIC OBSTRUCTIVE PULMONARY DISEASE, UNSPECIFIED COPD TYPE (MULTI): ICD-10-CM

## 2025-06-11 DIAGNOSIS — J45.20 MILD INTERMITTENT ASTHMA WITHOUT COMPLICATION (HHS-HCC): ICD-10-CM

## 2025-06-11 DIAGNOSIS — E11.00 TYPE 2 DIABETES MELLITUS WITH HYPEROSMOLARITY WITHOUT COMA, WITHOUT LONG-TERM CURRENT USE OF INSULIN (MULTI): ICD-10-CM

## 2025-06-11 RX ORDER — MONTELUKAST SODIUM 10 MG/1
10 TABLET ORAL NIGHTLY
Qty: 90 TABLET | Refills: 3 | Status: SHIPPED | OUTPATIENT
Start: 2025-06-11

## 2025-06-11 NOTE — PROGRESS NOTES
Clinical Pharmacy Visit    Vannessa Soto is a 55 y.o. female was referred to Clinical Pharmacy Team to complete a post-discharge medication optimization and monitoring visit. The patient was referred for their diabetes and COPD management.       Referring Provider: Atul Zhang MD  PCP: Taryn Dunn MD - last visit: 12/3      Subjective   Allergies[1]    [id number] - [facility] [address] [facility] [address] [facility]  [address] [address]  Phone: [phone] Fax: [phone]    [facility], [place] - [address]  [address] [facility] [address]  Phone: [phone] Fax: [phone]    [facility]  [address]  [facility] [address]  Phone: [phone] Fax: [phone]    [facility]  [address], [address]  [facility] [address]  Phone: [phone] Fax: [phone]      Medication System Management:  Affordability/Accessibility: approved for PAP  Adherence/Organization: no issues      Social History     Social History Narrative    Not on file          HPI      Review of Systems        Objective     There were no vitals taken for this visit.   BP Readings from Last 4 Encounters:   12/03/24 124/76   10/24/24 122/80   10/11/24 110/56   10/21/24 157/87      There were no vitals filed for this visit.     LAB  Lab Results   Component Value Date    BILITOT 0.5 10/09/2024    CALCIUM 9.3 10/11/2024    CO2 27 10/11/2024     10/11/2024    CREATININE 0.72 10/11/2024    GLUCOSE 182 (H) 10/11/2024    ALKPHOS 88 10/09/2024    K 4.0 10/11/2024    PROT 6.3 (L) 10/09/2024     10/11/2024    AST 13 10/09/2024    ALT 17 10/09/2024    BUN 22 10/11/2024    ANIONGAP 10 10/11/2024    MG 1.92 10/10/2024    ALBUMIN 4.1 10/09/2024    GFRF 77 05/01/2023     Lab Results   Component Value Date    TRIG 142 10/09/2024    CHOL 193 10/09/2024    LDLCALC 130 (H) 10/09/2024    HDL  35.0 10/09/2024     Lab Results   Component Value Date    HGBA1C 7.8 (H) 10/09/2024         Current Outpatient Medications   Medication Instructions    albuterol 90 mcg/actuation inhaler 2 puffs, inhalation, Every 6 hours PRN    aspirin 81 mg, oral, Daily    atorvastatin (LIPITOR) 40 mg, oral, Daily    blood pressure monitor kit Use as directed    budesonide-glycopyr-formoterol (Breztri Aerosphere) 160-9-4.8 mcg/actuation HFA aerosol inhaler 2 puffs, inhalation, 2 times daily    Jardiance 25 mg, oral, Daily    montelukast (SINGULAIR) 10 mg, oral, Nightly    nicotine (Nicoderm CQ) 21 mg/24 hr patch 1 patch, transdermal, Daily    pantoprazole (PROTONIX) 40 mg, oral, Daily before breakfast, Do not crush, chew, or split.    semaglutide (Ozempic) 0.25 mg or 0.5 mg (2 mg/3 mL) pen injector inject (0.5MG)  by subcutaneous route  every week on the same day of each week    tirzepatide (Mounjaro) 5 mg/0.5 mL pen injector inject (5MG)  by subcutaneous route  every week            Assessment/Plan   Problem List Items Addressed This Visit       Type 2 diabetes mellitus with hyperosmolarity without coma, without long-term current use of insulin (Multi) (Chronic)    Mild intermittent asthma without complication (HHS-HCC)    Primary hypertension     DM  Most recent A1c was 7.8% from 10/9/24  She has a dexcom to monitor her sugars continuously - she is connected to [employer] but does not have her data sharing on so not able to see her readings remotely   Usually ranges around the mid-100's  No hypo events either  Has been tolerating Ozempic well so will increase to 1mg once weekly   Continue Jardiance 25mg daily   Respiratory   Does not wear oxygen   Breathing is okay, but still getting SOB with exertion   Also feels more SOB with the warmer weather  Still getting the chest pain/sensations intermittently but it is better compared to when she was in the hospital   Recently saw her pulmonologist and wants her to do some additional  tests  Will continue with Breztri twice daily for maintenance (triple therapy)  Rinse mouth out after each use   Albuterol as needed   Singulair daily     PAP:  -she is an approved patient!  -eligibility will end 10/31    Follow Up: 4 months for PAP renewal    Continue all meds under the continuation of care with the referring provider and clinical pharmacy team.    Jim Padilla, Mehul     Verbal consent to manage patient's drug therapy was obtained from the patient. They were informed they may decline to participate or withdraw from participation in pharmacy services at any time.        [1] No Known Allergies

## 2025-06-17 PROCEDURE — RXMED WILLOW AMBULATORY MEDICATION CHARGE

## 2025-06-19 ENCOUNTER — PHARMACY VISIT (OUTPATIENT)
Dept: PHARMACY | Facility: CLINIC | Age: 55
End: 2025-06-19
Payer: COMMERCIAL

## 2025-07-14 PROCEDURE — RXMED WILLOW AMBULATORY MEDICATION CHARGE

## 2025-07-16 ENCOUNTER — PHARMACY VISIT (OUTPATIENT)
Dept: PHARMACY | Facility: CLINIC | Age: 55
End: 2025-07-16
Payer: COMMERCIAL

## 2025-07-29 PROCEDURE — RXMED WILLOW AMBULATORY MEDICATION CHARGE

## 2025-07-31 ENCOUNTER — PHARMACY VISIT (OUTPATIENT)
Dept: PHARMACY | Facility: CLINIC | Age: 55
End: 2025-07-31
Payer: COMMERCIAL

## 2025-07-31 RX ORDER — SEMAGLUTIDE 0.68 MG/ML
INJECTION, SOLUTION SUBCUTANEOUS
Qty: 3 ML | Refills: 3 | OUTPATIENT
Start: 2025-07-31 | End: 2025-08-02 | Stop reason: DRUGHIGH

## 2025-07-31 RX ORDER — EMPAGLIFLOZIN 25 MG/1
TABLET, FILM COATED ORAL
Qty: 30 TABLET | Refills: 3 | OUTPATIENT
Start: 2025-07-31

## 2025-07-31 RX ORDER — ALBUTEROL SULFATE 90 UG/1
INHALANT RESPIRATORY (INHALATION)
Qty: 18 G | Refills: 2 | OUTPATIENT
Start: 2025-07-31

## 2025-08-02 DIAGNOSIS — E11.00 TYPE 2 DIABETES MELLITUS WITH HYPEROSMOLARITY WITHOUT COMA, WITHOUT LONG-TERM CURRENT USE OF INSULIN (MULTI): Primary | ICD-10-CM

## 2025-08-02 PROCEDURE — RXMED WILLOW AMBULATORY MEDICATION CHARGE

## 2025-08-02 RX ORDER — SEMAGLUTIDE 0.68 MG/ML
0.5 INJECTION, SOLUTION SUBCUTANEOUS WEEKLY
Qty: 6 ML | Refills: 3 | Status: SHIPPED | OUTPATIENT
Start: 2025-08-02

## 2025-08-05 ENCOUNTER — PHARMACY VISIT (OUTPATIENT)
Dept: PHARMACY | Facility: CLINIC | Age: 55
End: 2025-08-05
Payer: COMMERCIAL

## 2025-08-05 PROCEDURE — RXMED WILLOW AMBULATORY MEDICATION CHARGE

## 2025-08-07 ENCOUNTER — PHARMACY VISIT (OUTPATIENT)
Dept: PHARMACY | Facility: CLINIC | Age: 55
End: 2025-08-07
Payer: COMMERCIAL

## 2025-10-16 ENCOUNTER — APPOINTMENT (OUTPATIENT)
Dept: PHARMACY | Facility: HOSPITAL | Age: 55
End: 2025-10-16
Payer: COMMERCIAL

## (undated) DEVICE — CONMED SCOPE SAVER BITE BLOCK, 20X27 MM: Brand: SCOPE SAVER

## (undated) DEVICE — STRAP, VELCRO, BODY, 4 X 60IN, NS

## (undated) DEVICE — TRAY, DRY PREP, PREMIUM

## (undated) DEVICE — DRAPE SHEET, UTILITY, 26 X 15, W/ TAPE, STERILE

## (undated) DEVICE — GLOVE, SURGICAL, PROTEXIS PI , 7.5, PF, LF

## (undated) DEVICE — BLADE, GEN COATED 2.75, LF

## (undated) DEVICE — Device: Brand: ENDO SMARTCAP

## (undated) DEVICE — SUTURE, VICRYL, 3-0, 27 IN, SH

## (undated) DEVICE — TOWEL PACK 10-PK

## (undated) DEVICE — MANIFOLD, 4 PORT NEPTUNE STANDARD

## (undated) DEVICE — SUTURE, VICRYL, 3-0, 27 IN X-1, UNDYED

## (undated) DEVICE — TUBING, SUCTION, 1/4" X 10', STRAIGHT: Brand: MEDLINE

## (undated) DEVICE — GOWN, SURGICAL, ROYAL SILK, LG, STERILE

## (undated) DEVICE — Device

## (undated) DEVICE — STRAP, ARM BOARD, 32 X 1.5

## (undated) DEVICE — BRUSH ENDO CLN L90.5IN SHTH DIA1.7MM BRIST DIA5-7MM 2-6MM

## (undated) DEVICE — TUBE SET 96 MM 64 MM H2O PERISTALTIC STD AUX CHANNEL

## (undated) DEVICE — SINGLE PORT MANIFOLD: Brand: NEPTUNE 2

## (undated) DEVICE — SUTURE, VICRYL, 3-0, 18 IN, VIOLEET

## (undated) DEVICE — ENDO CARRY-ON PROCEDURE KIT: Brand: ENDO CARRY-ON PROCEDURE KIT

## (undated) DEVICE — ADHESIVE, SKIN, LIQUIBAND EXCEED

## (undated) DEVICE — FORCEPS BX L240CM JAW DIA2.8MM L CAP W/ NDL MIC MESH TOOTH

## (undated) DEVICE — NEEDLE, SAFETY, 25 GA X 1.5 IN

## (undated) DEVICE — GOWN, SURGICAL, ROYAL SILK, XL, STERILE

## (undated) DEVICE — SYRINGE, CONTROL, ANGIOGRAPHIC, FIXED MALE LUER, 10 CC

## (undated) DEVICE — SOLUTION, SCRUB EXIDINE, 4% CHG, 8 OZ

## (undated) DEVICE — SUTURE, MONOCRYL, 4-0, 27 IN, PS-2, UNDYED